# Patient Record
Sex: FEMALE | Race: BLACK OR AFRICAN AMERICAN | NOT HISPANIC OR LATINO | URBAN - METROPOLITAN AREA
[De-identification: names, ages, dates, MRNs, and addresses within clinical notes are randomized per-mention and may not be internally consistent; named-entity substitution may affect disease eponyms.]

---

## 2020-01-01 ENCOUNTER — INPATIENT (INPATIENT)
Facility: HOSPITAL | Age: 60
LOS: 5 days | DRG: 393 | End: 2020-07-07
Attending: HOSPITALIST | Admitting: INTERNAL MEDICINE
Payer: MEDICAID

## 2020-01-01 ENCOUNTER — EMERGENCY (EMERGENCY)
Facility: HOSPITAL | Age: 60
LOS: 1 days | Discharge: HOME | End: 2020-01-01
Attending: EMERGENCY MEDICINE
Payer: MEDICAID

## 2020-01-01 ENCOUNTER — EMERGENCY (EMERGENCY)
Facility: HOSPITAL | Age: 60
LOS: 1 days | Discharge: HOME | End: 2020-01-01
Attending: EMERGENCY MEDICINE
Payer: COMMERCIAL

## 2020-01-01 VITALS
SYSTOLIC BLOOD PRESSURE: 155 MMHG | TEMPERATURE: 99 F | RESPIRATION RATE: 16 BRPM | HEART RATE: 101 BPM | OXYGEN SATURATION: 96 % | DIASTOLIC BLOOD PRESSURE: 97 MMHG

## 2020-01-01 VITALS
SYSTOLIC BLOOD PRESSURE: 124 MMHG | DIASTOLIC BLOOD PRESSURE: 79 MMHG | HEART RATE: 104 BPM | OXYGEN SATURATION: 92 % | RESPIRATION RATE: 17 BRPM

## 2020-01-01 VITALS
TEMPERATURE: 98 F | OXYGEN SATURATION: 98 % | DIASTOLIC BLOOD PRESSURE: 82 MMHG | SYSTOLIC BLOOD PRESSURE: 128 MMHG | RESPIRATION RATE: 19 BRPM | HEART RATE: 98 BPM

## 2020-01-01 VITALS
DIASTOLIC BLOOD PRESSURE: 74 MMHG | TEMPERATURE: 98 F | HEART RATE: 113 BPM | SYSTOLIC BLOOD PRESSURE: 142 MMHG | RESPIRATION RATE: 20 BRPM | OXYGEN SATURATION: 100 %

## 2020-01-01 VITALS
RESPIRATION RATE: 18 BRPM | HEART RATE: 88 BPM | DIASTOLIC BLOOD PRESSURE: 76 MMHG | OXYGEN SATURATION: 96 % | TEMPERATURE: 99 F | SYSTOLIC BLOOD PRESSURE: 146 MMHG

## 2020-01-01 VITALS
SYSTOLIC BLOOD PRESSURE: 155 MMHG | RESPIRATION RATE: 18 BRPM | OXYGEN SATURATION: 99 % | TEMPERATURE: 98 F | DIASTOLIC BLOOD PRESSURE: 95 MMHG | HEART RATE: 110 BPM

## 2020-01-01 VITALS
SYSTOLIC BLOOD PRESSURE: 146 MMHG | OXYGEN SATURATION: 99 % | HEART RATE: 102 BPM | RESPIRATION RATE: 16 BRPM | TEMPERATURE: 99 F | DIASTOLIC BLOOD PRESSURE: 78 MMHG

## 2020-01-01 VITALS
OXYGEN SATURATION: 95 % | DIASTOLIC BLOOD PRESSURE: 88 MMHG | SYSTOLIC BLOOD PRESSURE: 148 MMHG | TEMPERATURE: 98 F | RESPIRATION RATE: 18 BRPM | HEART RATE: 98 BPM

## 2020-01-01 VITALS
OXYGEN SATURATION: 97 % | TEMPERATURE: 99 F | HEART RATE: 99 BPM | SYSTOLIC BLOOD PRESSURE: 134 MMHG | RESPIRATION RATE: 18 BRPM | DIASTOLIC BLOOD PRESSURE: 87 MMHG

## 2020-01-01 VITALS
DIASTOLIC BLOOD PRESSURE: 99 MMHG | HEART RATE: 109 BPM | SYSTOLIC BLOOD PRESSURE: 178 MMHG | RESPIRATION RATE: 16 BRPM | HEIGHT: 66 IN | TEMPERATURE: 98 F | WEIGHT: 184.97 LBS | OXYGEN SATURATION: 96 %

## 2020-01-01 DIAGNOSIS — I46.9 CARDIAC ARREST, CAUSE UNSPECIFIED: ICD-10-CM

## 2020-01-01 DIAGNOSIS — Z51.5 ENCOUNTER FOR PALLIATIVE CARE: ICD-10-CM

## 2020-01-01 DIAGNOSIS — R13.10 DYSPHAGIA, UNSPECIFIED: ICD-10-CM

## 2020-01-01 DIAGNOSIS — G89.3 NEOPLASM RELATED PAIN (ACUTE) (CHRONIC): ICD-10-CM

## 2020-01-01 DIAGNOSIS — Z98.890 OTHER SPECIFIED POSTPROCEDURAL STATES: Chronic | ICD-10-CM

## 2020-01-01 DIAGNOSIS — Z43.1 ENCOUNTER FOR ATTENTION TO GASTROSTOMY: ICD-10-CM

## 2020-01-01 DIAGNOSIS — C71.9 MALIGNANT NEOPLASM OF BRAIN, UNSPECIFIED: ICD-10-CM

## 2020-01-01 DIAGNOSIS — T85.598A OTHER MECHANICAL COMPLICATION OF OTHER GASTROINTESTINAL PROSTHETIC DEVICES, IMPLANTS AND GRAFTS, INITIAL ENCOUNTER: ICD-10-CM

## 2020-01-01 LAB
A1C WITH ESTIMATED AVERAGE GLUCOSE RESULT: 5.6 % — SIGNIFICANT CHANGE UP (ref 4–5.6)
ALBUMIN SERPL ELPH-MCNC: 2.6 G/DL — LOW (ref 3.3–5.2)
ALBUMIN SERPL ELPH-MCNC: 2.8 G/DL — LOW (ref 3.3–5.2)
ALBUMIN SERPL ELPH-MCNC: 2.8 G/DL — LOW (ref 3.3–5.2)
ALBUMIN SERPL ELPH-MCNC: 3.1 G/DL — LOW (ref 3.3–5.2)
ALP SERPL-CCNC: 113 U/L — SIGNIFICANT CHANGE UP (ref 40–120)
ALP SERPL-CCNC: 126 U/L — HIGH (ref 40–120)
ALP SERPL-CCNC: 126 U/L — HIGH (ref 40–120)
ALP SERPL-CCNC: 127 U/L — HIGH (ref 40–120)
ALT FLD-CCNC: 11 U/L — SIGNIFICANT CHANGE UP
ALT FLD-CCNC: 12 U/L — SIGNIFICANT CHANGE UP
ALT FLD-CCNC: 13 U/L — SIGNIFICANT CHANGE UP
ALT FLD-CCNC: 15 U/L — SIGNIFICANT CHANGE UP
ANION GAP SERPL CALC-SCNC: 10 MMOL/L — SIGNIFICANT CHANGE UP (ref 5–17)
ANION GAP SERPL CALC-SCNC: 11 MMOL/L — SIGNIFICANT CHANGE UP (ref 5–17)
ANION GAP SERPL CALC-SCNC: 13 MMOL/L — SIGNIFICANT CHANGE UP (ref 5–17)
ANION GAP SERPL CALC-SCNC: 13 MMOL/L — SIGNIFICANT CHANGE UP (ref 5–17)
ANION GAP SERPL CALC-SCNC: 14 MMOL/L — SIGNIFICANT CHANGE UP (ref 5–17)
ANION GAP SERPL CALC-SCNC: 15 MMOL/L — SIGNIFICANT CHANGE UP (ref 5–17)
ANISOCYTOSIS BLD QL: SLIGHT — SIGNIFICANT CHANGE UP
APTT BLD: 23.4 SEC — LOW (ref 27.5–36.3)
APTT BLD: 24.1 SEC — LOW (ref 27.5–36.3)
APTT BLD: 34.2 SEC — SIGNIFICANT CHANGE UP (ref 27.5–35.5)
AST SERPL-CCNC: 16 U/L — SIGNIFICANT CHANGE UP
AST SERPL-CCNC: 19 U/L — SIGNIFICANT CHANGE UP
AST SERPL-CCNC: 19 U/L — SIGNIFICANT CHANGE UP
AST SERPL-CCNC: 26 U/L — SIGNIFICANT CHANGE UP
BASOPHILS # BLD AUTO: 0 K/UL — SIGNIFICANT CHANGE UP (ref 0–0.2)
BASOPHILS # BLD AUTO: 0.01 K/UL — SIGNIFICANT CHANGE UP (ref 0–0.2)
BASOPHILS NFR BLD AUTO: 0 % — SIGNIFICANT CHANGE UP (ref 0–2)
BASOPHILS NFR BLD AUTO: 0.1 % — SIGNIFICANT CHANGE UP (ref 0–2)
BILIRUB SERPL-MCNC: 0.3 MG/DL — LOW (ref 0.4–2)
BILIRUB SERPL-MCNC: 0.4 MG/DL — SIGNIFICANT CHANGE UP (ref 0.4–2)
BLD GP AB SCN SERPL QL: SIGNIFICANT CHANGE UP
BLD GP AB SCN SERPL QL: SIGNIFICANT CHANGE UP
BUN SERPL-MCNC: 11 MG/DL — SIGNIFICANT CHANGE UP (ref 8–20)
BUN SERPL-MCNC: 14 MG/DL — SIGNIFICANT CHANGE UP (ref 8–20)
BUN SERPL-MCNC: 5 MG/DL — LOW (ref 8–20)
BUN SERPL-MCNC: 8 MG/DL — SIGNIFICANT CHANGE UP (ref 8–20)
CALCIUM SERPL-MCNC: 9.1 MG/DL — SIGNIFICANT CHANGE UP (ref 8.6–10.2)
CALCIUM SERPL-MCNC: 9.2 MG/DL — SIGNIFICANT CHANGE UP (ref 8.6–10.2)
CALCIUM SERPL-MCNC: 9.5 MG/DL — SIGNIFICANT CHANGE UP (ref 8.6–10.2)
CALCIUM SERPL-MCNC: 9.8 MG/DL — SIGNIFICANT CHANGE UP (ref 8.6–10.2)
CHLORIDE SERPL-SCNC: 102 MMOL/L — SIGNIFICANT CHANGE UP (ref 98–107)
CHLORIDE SERPL-SCNC: 103 MMOL/L — SIGNIFICANT CHANGE UP (ref 98–107)
CO2 SERPL-SCNC: 24 MMOL/L — SIGNIFICANT CHANGE UP (ref 22–29)
CO2 SERPL-SCNC: 25 MMOL/L — SIGNIFICANT CHANGE UP (ref 22–29)
CO2 SERPL-SCNC: 27 MMOL/L — SIGNIFICANT CHANGE UP (ref 22–29)
CO2 SERPL-SCNC: 29 MMOL/L — SIGNIFICANT CHANGE UP (ref 22–29)
CO2 SERPL-SCNC: 30 MMOL/L — HIGH (ref 22–29)
CO2 SERPL-SCNC: 31 MMOL/L — HIGH (ref 22–29)
CREAT SERPL-MCNC: 0.2 MG/DL — LOW (ref 0.5–1.3)
CREAT SERPL-MCNC: 0.26 MG/DL — LOW (ref 0.5–1.3)
CREAT SERPL-MCNC: 0.28 MG/DL — LOW (ref 0.5–1.3)
CREAT SERPL-MCNC: 0.37 MG/DL — LOW (ref 0.5–1.3)
CREAT SERPL-MCNC: 0.38 MG/DL — LOW (ref 0.5–1.3)
CREAT SERPL-MCNC: 0.45 MG/DL — LOW (ref 0.5–1.3)
D DIMER BLD IA.RAPID-MCNC: 4931 NG/ML DDU — HIGH
EOSINOPHIL # BLD AUTO: 0.07 K/UL — SIGNIFICANT CHANGE UP (ref 0–0.5)
EOSINOPHIL # BLD AUTO: 0.15 K/UL — SIGNIFICANT CHANGE UP (ref 0–0.5)
EOSINOPHIL NFR BLD AUTO: 0.9 % — SIGNIFICANT CHANGE UP (ref 0–6)
EOSINOPHIL NFR BLD AUTO: 1.5 % — SIGNIFICANT CHANGE UP (ref 0–6)
ESTIMATED AVERAGE GLUCOSE: 114 MG/DL — SIGNIFICANT CHANGE UP (ref 68–114)
GLUCOSE BLDC GLUCOMTR-MCNC: 100 MG/DL — HIGH (ref 70–99)
GLUCOSE BLDC GLUCOMTR-MCNC: 105 MG/DL — HIGH (ref 70–99)
GLUCOSE BLDC GLUCOMTR-MCNC: 106 MG/DL — HIGH (ref 70–99)
GLUCOSE BLDC GLUCOMTR-MCNC: 111 MG/DL — HIGH (ref 70–99)
GLUCOSE BLDC GLUCOMTR-MCNC: 115 MG/DL — HIGH (ref 70–99)
GLUCOSE BLDC GLUCOMTR-MCNC: 116 MG/DL — HIGH (ref 70–99)
GLUCOSE BLDC GLUCOMTR-MCNC: 121 MG/DL — HIGH (ref 70–99)
GLUCOSE BLDC GLUCOMTR-MCNC: 122 MG/DL — HIGH (ref 70–99)
GLUCOSE BLDC GLUCOMTR-MCNC: 132 MG/DL — HIGH (ref 70–99)
GLUCOSE BLDC GLUCOMTR-MCNC: 133 MG/DL — HIGH (ref 70–99)
GLUCOSE BLDC GLUCOMTR-MCNC: 148 MG/DL — HIGH (ref 70–99)
GLUCOSE BLDC GLUCOMTR-MCNC: 162 MG/DL — HIGH (ref 70–99)
GLUCOSE BLDC GLUCOMTR-MCNC: 189 MG/DL — HIGH (ref 70–99)
GLUCOSE BLDC GLUCOMTR-MCNC: 78 MG/DL — SIGNIFICANT CHANGE UP (ref 70–99)
GLUCOSE BLDC GLUCOMTR-MCNC: 87 MG/DL — SIGNIFICANT CHANGE UP (ref 70–99)
GLUCOSE BLDC GLUCOMTR-MCNC: 89 MG/DL — SIGNIFICANT CHANGE UP (ref 70–99)
GLUCOSE BLDC GLUCOMTR-MCNC: 94 MG/DL — SIGNIFICANT CHANGE UP (ref 70–99)
GLUCOSE BLDC GLUCOMTR-MCNC: 95 MG/DL — SIGNIFICANT CHANGE UP (ref 70–99)
GLUCOSE SERPL-MCNC: 108 MG/DL — HIGH (ref 70–99)
GLUCOSE SERPL-MCNC: 114 MG/DL — HIGH (ref 70–99)
GLUCOSE SERPL-MCNC: 117 MG/DL — HIGH (ref 70–99)
GLUCOSE SERPL-MCNC: 170 MG/DL — HIGH (ref 70–99)
GLUCOSE SERPL-MCNC: 92 MG/DL — SIGNIFICANT CHANGE UP (ref 70–99)
GLUCOSE SERPL-MCNC: 93 MG/DL — SIGNIFICANT CHANGE UP (ref 70–99)
HCT VFR BLD CALC: 36.2 % — SIGNIFICANT CHANGE UP (ref 34.5–45)
HCT VFR BLD CALC: 38.5 % — SIGNIFICANT CHANGE UP (ref 34.5–45)
HCT VFR BLD CALC: 38.7 % — SIGNIFICANT CHANGE UP (ref 34.5–45)
HCT VFR BLD CALC: 39 % — SIGNIFICANT CHANGE UP (ref 34.5–45)
HCT VFR BLD CALC: 41.7 % — SIGNIFICANT CHANGE UP (ref 34.5–45)
HCV AB S/CO SERPL IA: 0.07 S/CO — SIGNIFICANT CHANGE UP (ref 0–0.99)
HCV AB SERPL-IMP: SIGNIFICANT CHANGE UP
HGB BLD-MCNC: 11.6 G/DL — SIGNIFICANT CHANGE UP (ref 11.5–15.5)
HGB BLD-MCNC: 12.5 G/DL — SIGNIFICANT CHANGE UP (ref 11.5–15.5)
HGB BLD-MCNC: 12.6 G/DL — SIGNIFICANT CHANGE UP (ref 11.5–15.5)
HGB BLD-MCNC: 12.6 G/DL — SIGNIFICANT CHANGE UP (ref 11.5–15.5)
HGB BLD-MCNC: 13.1 G/DL — SIGNIFICANT CHANGE UP (ref 11.5–15.5)
HYPOCHROMIA BLD QL: SLIGHT — SIGNIFICANT CHANGE UP
IMM GRANULOCYTES NFR BLD AUTO: 0.4 % — SIGNIFICANT CHANGE UP (ref 0–1.5)
INR BLD: 1.14 RATIO — SIGNIFICANT CHANGE UP (ref 0.88–1.16)
INR BLD: 1.2 RATIO — HIGH (ref 0.88–1.16)
INR BLD: 1.2 RATIO — HIGH (ref 0.88–1.16)
INR BLD: 1.33 RATIO — HIGH (ref 0.88–1.16)
LIDOCAIN IGE QN: 20 U/L — LOW (ref 22–51)
LYMPHOCYTES # BLD AUTO: 0.73 K/UL — LOW (ref 1–3.3)
LYMPHOCYTES # BLD AUTO: 1.26 K/UL — SIGNIFICANT CHANGE UP (ref 1–3.3)
LYMPHOCYTES # BLD AUTO: 12.8 % — LOW (ref 13–44)
LYMPHOCYTES # BLD AUTO: 9.7 % — LOW (ref 13–44)
MACROCYTES BLD QL: SLIGHT — SIGNIFICANT CHANGE UP
MANUAL SMEAR VERIFICATION: SIGNIFICANT CHANGE UP
MCHC RBC-ENTMCNC: 29.7 PG — SIGNIFICANT CHANGE UP (ref 27–34)
MCHC RBC-ENTMCNC: 29.8 PG — SIGNIFICANT CHANGE UP (ref 27–34)
MCHC RBC-ENTMCNC: 30.3 PG — SIGNIFICANT CHANGE UP (ref 27–34)
MCHC RBC-ENTMCNC: 30.4 PG — SIGNIFICANT CHANGE UP (ref 27–34)
MCHC RBC-ENTMCNC: 30.7 PG — SIGNIFICANT CHANGE UP (ref 27–34)
MCHC RBC-ENTMCNC: 31.4 GM/DL — LOW (ref 32–36)
MCHC RBC-ENTMCNC: 32 GM/DL — SIGNIFICANT CHANGE UP (ref 32–36)
MCHC RBC-ENTMCNC: 32.3 GM/DL — SIGNIFICANT CHANGE UP (ref 32–36)
MCHC RBC-ENTMCNC: 32.3 GM/DL — SIGNIFICANT CHANGE UP (ref 32–36)
MCHC RBC-ENTMCNC: 32.7 GM/DL — SIGNIFICANT CHANGE UP (ref 32–36)
MCV RBC AUTO: 92.4 FL — SIGNIFICANT CHANGE UP (ref 80–100)
MCV RBC AUTO: 92.8 FL — SIGNIFICANT CHANGE UP (ref 80–100)
MCV RBC AUTO: 93.8 FL — SIGNIFICANT CHANGE UP (ref 80–100)
MCV RBC AUTO: 94.6 FL — SIGNIFICANT CHANGE UP (ref 80–100)
MCV RBC AUTO: 95.8 FL — SIGNIFICANT CHANGE UP (ref 80–100)
METAMYELOCYTES # FLD: 0.9 % — HIGH (ref 0–0)
MONOCYTES # BLD AUTO: 0.46 K/UL — SIGNIFICANT CHANGE UP (ref 0–0.9)
MONOCYTES # BLD AUTO: 0.82 K/UL — SIGNIFICANT CHANGE UP (ref 0–0.9)
MONOCYTES NFR BLD AUTO: 6.1 % — SIGNIFICANT CHANGE UP (ref 2–14)
MONOCYTES NFR BLD AUTO: 8.3 % — SIGNIFICANT CHANGE UP (ref 2–14)
MYELOCYTES NFR BLD: 2.6 % — HIGH (ref 0–0)
NEUTROPHILS # BLD AUTO: 5.93 K/UL — SIGNIFICANT CHANGE UP (ref 1.8–7.4)
NEUTROPHILS # BLD AUTO: 7.56 K/UL — HIGH (ref 1.8–7.4)
NEUTROPHILS NFR BLD AUTO: 76.9 % — SIGNIFICANT CHANGE UP (ref 43–77)
NEUTROPHILS NFR BLD AUTO: 78.9 % — HIGH (ref 43–77)
NT-PROBNP SERPL-SCNC: 286 PG/ML — SIGNIFICANT CHANGE UP (ref 0–300)
OVALOCYTES BLD QL SMEAR: SLIGHT — SIGNIFICANT CHANGE UP
PLAT MORPH BLD: NORMAL — SIGNIFICANT CHANGE UP
PLATELET # BLD AUTO: 176 K/UL — SIGNIFICANT CHANGE UP (ref 150–400)
PLATELET # BLD AUTO: 189 K/UL — SIGNIFICANT CHANGE UP (ref 150–400)
PLATELET # BLD AUTO: 209 K/UL — SIGNIFICANT CHANGE UP (ref 150–400)
PLATELET # BLD AUTO: 209 K/UL — SIGNIFICANT CHANGE UP (ref 150–400)
PLATELET # BLD AUTO: 331 K/UL — SIGNIFICANT CHANGE UP (ref 150–400)
POIKILOCYTOSIS BLD QL AUTO: SLIGHT — SIGNIFICANT CHANGE UP
POLYCHROMASIA BLD QL SMEAR: SLIGHT — SIGNIFICANT CHANGE UP
POTASSIUM SERPL-MCNC: 3.2 MMOL/L — LOW (ref 3.5–5.3)
POTASSIUM SERPL-MCNC: 3.6 MMOL/L — SIGNIFICANT CHANGE UP (ref 3.5–5.3)
POTASSIUM SERPL-MCNC: 3.7 MMOL/L — SIGNIFICANT CHANGE UP (ref 3.5–5.3)
POTASSIUM SERPL-MCNC: 3.8 MMOL/L — SIGNIFICANT CHANGE UP (ref 3.5–5.3)
POTASSIUM SERPL-SCNC: 3.2 MMOL/L — LOW (ref 3.5–5.3)
POTASSIUM SERPL-SCNC: 3.6 MMOL/L — SIGNIFICANT CHANGE UP (ref 3.5–5.3)
POTASSIUM SERPL-SCNC: 3.7 MMOL/L — SIGNIFICANT CHANGE UP (ref 3.5–5.3)
POTASSIUM SERPL-SCNC: 3.8 MMOL/L — SIGNIFICANT CHANGE UP (ref 3.5–5.3)
PROT SERPL-MCNC: 5.5 G/DL — LOW (ref 6.6–8.7)
PROT SERPL-MCNC: 5.5 G/DL — LOW (ref 6.6–8.7)
PROT SERPL-MCNC: 5.7 G/DL — LOW (ref 6.6–8.7)
PROT SERPL-MCNC: 5.7 G/DL — LOW (ref 6.6–8.7)
PROTHROM AB SERPL-ACNC: 12.9 SEC — SIGNIFICANT CHANGE UP (ref 10–12.9)
PROTHROM AB SERPL-ACNC: 13.6 SEC — HIGH (ref 10–12.9)
PROTHROM AB SERPL-ACNC: 13.8 SEC — HIGH (ref 10.6–13.6)
PROTHROM AB SERPL-ACNC: 15.2 SEC — HIGH (ref 10.6–13.6)
RBC # BLD: 3.78 M/UL — LOW (ref 3.8–5.2)
RBC # BLD: 4.15 M/UL — SIGNIFICANT CHANGE UP (ref 3.8–5.2)
RBC # BLD: 4.16 M/UL — SIGNIFICANT CHANGE UP (ref 3.8–5.2)
RBC # BLD: 4.19 M/UL — SIGNIFICANT CHANGE UP (ref 3.8–5.2)
RBC # BLD: 4.41 M/UL — SIGNIFICANT CHANGE UP (ref 3.8–5.2)
RBC # FLD: 15.5 % — HIGH (ref 10.3–14.5)
RBC # FLD: 15.8 % — HIGH (ref 10.3–14.5)
RBC # FLD: 15.8 % — HIGH (ref 10.3–14.5)
RBC # FLD: 15.9 % — HIGH (ref 10.3–14.5)
RBC # FLD: 16 % — HIGH (ref 10.3–14.5)
RBC BLD AUTO: ABNORMAL
SARS-COV-2 IGG SERPL QL IA: NEGATIVE — SIGNIFICANT CHANGE UP
SARS-COV-2 IGM SERPL IA-ACNC: <3.8 AU/ML — SIGNIFICANT CHANGE UP
SARS-COV-2 RNA SPEC QL NAA+PROBE: SIGNIFICANT CHANGE UP
SCHISTOCYTES BLD QL AUTO: SLIGHT — SIGNIFICANT CHANGE UP
SMUDGE CELLS # BLD: PRESENT — SIGNIFICANT CHANGE UP
SODIUM SERPL-SCNC: 141 MMOL/L — SIGNIFICANT CHANGE UP (ref 135–145)
SODIUM SERPL-SCNC: 141 MMOL/L — SIGNIFICANT CHANGE UP (ref 135–145)
SODIUM SERPL-SCNC: 142 MMOL/L — SIGNIFICANT CHANGE UP (ref 135–145)
SODIUM SERPL-SCNC: 143 MMOL/L — SIGNIFICANT CHANGE UP (ref 135–145)
SODIUM SERPL-SCNC: 145 MMOL/L — SIGNIFICANT CHANGE UP (ref 135–145)
SODIUM SERPL-SCNC: 145 MMOL/L — SIGNIFICANT CHANGE UP (ref 135–145)
TROPONIN T SERPL-MCNC: <0.01 NG/ML — SIGNIFICANT CHANGE UP (ref 0–0.06)
VARIANT LYMPHS # BLD: 0.9 % — SIGNIFICANT CHANGE UP (ref 0–6)
WBC # BLD: 10.83 K/UL — HIGH (ref 3.8–10.5)
WBC # BLD: 7.35 K/UL — SIGNIFICANT CHANGE UP (ref 3.8–10.5)
WBC # BLD: 7.51 K/UL — SIGNIFICANT CHANGE UP (ref 3.8–10.5)
WBC # BLD: 8.71 K/UL — SIGNIFICANT CHANGE UP (ref 3.8–10.5)
WBC # BLD: 9.84 K/UL — SIGNIFICANT CHANGE UP (ref 3.8–10.5)
WBC # FLD AUTO: 10.83 K/UL — HIGH (ref 3.8–10.5)
WBC # FLD AUTO: 7.35 K/UL — SIGNIFICANT CHANGE UP (ref 3.8–10.5)
WBC # FLD AUTO: 7.51 K/UL — SIGNIFICANT CHANGE UP (ref 3.8–10.5)
WBC # FLD AUTO: 8.71 K/UL — SIGNIFICANT CHANGE UP (ref 3.8–10.5)
WBC # FLD AUTO: 9.84 K/UL — SIGNIFICANT CHANGE UP (ref 3.8–10.5)

## 2020-01-01 PROCEDURE — 71045 X-RAY EXAM CHEST 1 VIEW: CPT | Mod: 26

## 2020-01-01 PROCEDURE — 85027 COMPLETE CBC AUTOMATED: CPT

## 2020-01-01 PROCEDURE — 86901 BLOOD TYPING SEROLOGIC RH(D): CPT

## 2020-01-01 PROCEDURE — 85610 PROTHROMBIN TIME: CPT

## 2020-01-01 PROCEDURE — 83690 ASSAY OF LIPASE: CPT

## 2020-01-01 PROCEDURE — L8699: CPT

## 2020-01-01 PROCEDURE — 71275 CT ANGIOGRAPHY CHEST: CPT

## 2020-01-01 PROCEDURE — 85730 THROMBOPLASTIN TIME PARTIAL: CPT

## 2020-01-01 PROCEDURE — 96375 TX/PRO/DX INJ NEW DRUG ADDON: CPT | Mod: XU

## 2020-01-01 PROCEDURE — U0003: CPT

## 2020-01-01 PROCEDURE — 86850 RBC ANTIBODY SCREEN: CPT

## 2020-01-01 PROCEDURE — 74177 CT ABD & PELVIS W/CONTRAST: CPT

## 2020-01-01 PROCEDURE — 96361 HYDRATE IV INFUSION ADD-ON: CPT

## 2020-01-01 PROCEDURE — 87635 SARS-COV-2 COVID-19 AMP PRB: CPT

## 2020-01-01 PROCEDURE — 99284 EMERGENCY DEPT VISIT MOD MDM: CPT | Mod: 25

## 2020-01-01 PROCEDURE — 99239 HOSP IP/OBS DSCHRG MGMT >30: CPT

## 2020-01-01 PROCEDURE — 99232 SBSQ HOSP IP/OBS MODERATE 35: CPT

## 2020-01-01 PROCEDURE — 49450 REPLACE G/C TUBE PERC: CPT

## 2020-01-01 PROCEDURE — 43762 RPLC GTUBE NO REVJ TRC: CPT

## 2020-01-01 PROCEDURE — 84702 CHORIONIC GONADOTROPIN TEST: CPT

## 2020-01-01 PROCEDURE — 96376 TX/PRO/DX INJ SAME DRUG ADON: CPT | Mod: XU

## 2020-01-01 PROCEDURE — 86900 BLOOD TYPING SEROLOGIC ABO: CPT

## 2020-01-01 PROCEDURE — 96375 TX/PRO/DX INJ NEW DRUG ADDON: CPT

## 2020-01-01 PROCEDURE — 99285 EMERGENCY DEPT VISIT HI MDM: CPT | Mod: 25

## 2020-01-01 PROCEDURE — 99233 SBSQ HOSP IP/OBS HIGH 50: CPT

## 2020-01-01 PROCEDURE — 82962 GLUCOSE BLOOD TEST: CPT

## 2020-01-01 PROCEDURE — 95819 EEG AWAKE AND ASLEEP: CPT | Mod: 26

## 2020-01-01 PROCEDURE — 76000 FLUOROSCOPY <1 HR PHYS/QHP: CPT

## 2020-01-01 PROCEDURE — 99284 EMERGENCY DEPT VISIT MOD MDM: CPT

## 2020-01-01 PROCEDURE — 96365 THER/PROPH/DIAG IV INF INIT: CPT | Mod: XU

## 2020-01-01 PROCEDURE — 70450 CT HEAD/BRAIN W/O DYE: CPT | Mod: 26

## 2020-01-01 PROCEDURE — 99223 1ST HOSP IP/OBS HIGH 75: CPT

## 2020-01-01 PROCEDURE — 80048 BASIC METABOLIC PNL TOTAL CA: CPT

## 2020-01-01 PROCEDURE — 74176 CT ABD & PELVIS W/O CONTRAST: CPT | Mod: 26

## 2020-01-01 PROCEDURE — 74018 RADEX ABDOMEN 1 VIEW: CPT | Mod: 26

## 2020-01-01 PROCEDURE — 80053 COMPREHEN METABOLIC PANEL: CPT

## 2020-01-01 PROCEDURE — 70450 CT HEAD/BRAIN W/O DYE: CPT

## 2020-01-01 PROCEDURE — 74018 RADEX ABDOMEN 1 VIEW: CPT

## 2020-01-01 PROCEDURE — 71275 CT ANGIOGRAPHY CHEST: CPT | Mod: 26

## 2020-01-01 PROCEDURE — 85379 FIBRIN DEGRADATION QUANT: CPT

## 2020-01-01 PROCEDURE — 99285 EMERGENCY DEPT VISIT HI MDM: CPT

## 2020-01-01 PROCEDURE — 74177 CT ABD & PELVIS W/CONTRAST: CPT | Mod: 26

## 2020-01-01 PROCEDURE — 36415 COLL VENOUS BLD VENIPUNCTURE: CPT

## 2020-01-01 PROCEDURE — 86769 SARS-COV-2 COVID-19 ANTIBODY: CPT

## 2020-01-01 PROCEDURE — C1769: CPT

## 2020-01-01 PROCEDURE — 86803 HEPATITIS C AB TEST: CPT

## 2020-01-01 PROCEDURE — 95819 EEG AWAKE AND ASLEEP: CPT

## 2020-01-01 PROCEDURE — 84484 ASSAY OF TROPONIN QUANT: CPT

## 2020-01-01 PROCEDURE — 71045 X-RAY EXAM CHEST 1 VIEW: CPT

## 2020-01-01 PROCEDURE — 96374 THER/PROPH/DIAG INJ IV PUSH: CPT

## 2020-01-01 PROCEDURE — 74176 CT ABD & PELVIS W/O CONTRAST: CPT

## 2020-01-01 PROCEDURE — 83036 HEMOGLOBIN GLYCOSYLATED A1C: CPT

## 2020-01-01 PROCEDURE — 83880 ASSAY OF NATRIURETIC PEPTIDE: CPT

## 2020-01-01 PROCEDURE — 96374 THER/PROPH/DIAG INJ IV PUSH: CPT | Mod: XU

## 2020-01-01 RX ORDER — OXYCODONE HYDROCHLORIDE 5 MG/1
15 TABLET ORAL ONCE
Refills: 0 | Status: DISCONTINUED | OUTPATIENT
Start: 2020-01-01 | End: 2020-01-01

## 2020-01-01 RX ORDER — MORPHINE SULFATE 50 MG/1
4 CAPSULE, EXTENDED RELEASE ORAL ONCE
Refills: 0 | Status: DISCONTINUED | OUTPATIENT
Start: 2020-01-01 | End: 2020-01-01

## 2020-01-01 RX ORDER — MORPHINE SULFATE 50 MG/1
4 CAPSULE, EXTENDED RELEASE ORAL
Refills: 0 | Status: DISCONTINUED | OUTPATIENT
Start: 2020-01-01 | End: 2020-01-01

## 2020-01-01 RX ORDER — SODIUM CHLORIDE 9 MG/ML
1000 INJECTION, SOLUTION INTRAVENOUS ONCE
Refills: 0 | Status: COMPLETED | OUTPATIENT
Start: 2020-01-01 | End: 2020-01-01

## 2020-01-01 RX ORDER — POTASSIUM CHLORIDE 20 MEQ
10 PACKET (EA) ORAL ONCE
Refills: 0 | Status: COMPLETED | OUTPATIENT
Start: 2020-01-01 | End: 2020-01-01

## 2020-01-01 RX ORDER — LEVETIRACETAM 250 MG/1
1000 TABLET, FILM COATED ORAL
Refills: 0 | Status: DISCONTINUED | OUTPATIENT
Start: 2020-01-01 | End: 2020-01-01

## 2020-01-01 RX ORDER — POLYETHYLENE GLYCOL 3350 17 G/17G
17 POWDER, FOR SOLUTION ORAL DAILY
Refills: 0 | Status: DISCONTINUED | OUTPATIENT
Start: 2020-01-01 | End: 2020-01-01

## 2020-01-01 RX ORDER — ONDANSETRON 8 MG/1
4 TABLET, FILM COATED ORAL ONCE
Refills: 0 | Status: COMPLETED | OUTPATIENT
Start: 2020-01-01 | End: 2020-01-01

## 2020-01-01 RX ORDER — LEVETIRACETAM 250 MG/1
500 TABLET, FILM COATED ORAL EVERY 12 HOURS
Refills: 0 | Status: DISCONTINUED | OUTPATIENT
Start: 2020-01-01 | End: 2020-01-01

## 2020-01-01 RX ORDER — AMLODIPINE BESYLATE 2.5 MG/1
1 TABLET ORAL
Qty: 0 | Refills: 0 | DISCHARGE

## 2020-01-01 RX ORDER — LEVETIRACETAM 250 MG/1
5 TABLET, FILM COATED ORAL
Qty: 0 | Refills: 0 | DISCHARGE

## 2020-01-01 RX ORDER — SODIUM CHLORIDE 9 MG/ML
1000 INJECTION INTRAMUSCULAR; INTRAVENOUS; SUBCUTANEOUS ONCE
Refills: 0 | Status: COMPLETED | OUTPATIENT
Start: 2020-01-01 | End: 2020-01-01

## 2020-01-01 RX ORDER — SIMVASTATIN 20 MG/1
1 TABLET, FILM COATED ORAL
Qty: 0 | Refills: 0 | DISCHARGE

## 2020-01-01 RX ORDER — SODIUM CHLORIDE 9 MG/ML
1000 INJECTION, SOLUTION INTRAVENOUS
Refills: 0 | Status: DISCONTINUED | OUTPATIENT
Start: 2020-01-01 | End: 2020-01-01

## 2020-01-01 RX ORDER — ENOXAPARIN SODIUM 100 MG/ML
40 INJECTION SUBCUTANEOUS DAILY
Refills: 0 | Status: DISCONTINUED | OUTPATIENT
Start: 2020-01-01 | End: 2020-01-01

## 2020-01-01 RX ORDER — DEXAMETHASONE 0.5 MG/5ML
8 ELIXIR ORAL ONCE
Refills: 0 | Status: COMPLETED | OUTPATIENT
Start: 2020-01-01 | End: 2020-01-01

## 2020-01-01 RX ORDER — LEVETIRACETAM 250 MG/1
500 TABLET, FILM COATED ORAL ONCE
Refills: 0 | Status: COMPLETED | OUTPATIENT
Start: 2020-01-01 | End: 2020-01-01

## 2020-01-01 RX ORDER — HYDROMORPHONE HYDROCHLORIDE 2 MG/ML
1 INJECTION INTRAMUSCULAR; INTRAVENOUS; SUBCUTANEOUS ONCE
Refills: 0 | Status: DISCONTINUED | OUTPATIENT
Start: 2020-01-01 | End: 2020-01-01

## 2020-01-01 RX ORDER — LEVETIRACETAM 250 MG/1
500 TABLET, FILM COATED ORAL
Refills: 0 | Status: DISCONTINUED | OUTPATIENT
Start: 2020-01-01 | End: 2020-01-01

## 2020-01-01 RX ORDER — LEVETIRACETAM 250 MG/1
250 TABLET, FILM COATED ORAL EVERY 12 HOURS
Refills: 0 | Status: DISCONTINUED | OUTPATIENT
Start: 2020-01-01 | End: 2020-01-01

## 2020-01-01 RX ORDER — CEFAZOLIN SODIUM 1 G
1000 VIAL (EA) INJECTION ONCE
Refills: 0 | Status: DISCONTINUED | OUTPATIENT
Start: 2020-01-01 | End: 2020-01-01

## 2020-01-01 RX ORDER — SODIUM CHLORIDE 9 MG/ML
1000 INJECTION INTRAMUSCULAR; INTRAVENOUS; SUBCUTANEOUS
Refills: 0 | Status: COMPLETED | OUTPATIENT
Start: 2020-01-01 | End: 2020-01-01

## 2020-01-01 RX ORDER — SENNA PLUS 8.6 MG/1
2 TABLET ORAL AT BEDTIME
Refills: 0 | Status: DISCONTINUED | OUTPATIENT
Start: 2020-01-01 | End: 2020-01-01

## 2020-01-01 RX ORDER — SODIUM CHLORIDE 9 MG/ML
3 INJECTION INTRAMUSCULAR; INTRAVENOUS; SUBCUTANEOUS ONCE
Refills: 0 | Status: COMPLETED | OUTPATIENT
Start: 2020-01-01 | End: 2020-01-01

## 2020-01-01 RX ORDER — OXYCODONE HYDROCHLORIDE 5 MG/1
1 TABLET ORAL
Qty: 0 | Refills: 0 | DISCHARGE

## 2020-01-01 RX ORDER — METFORMIN HYDROCHLORIDE 850 MG/1
0 TABLET ORAL
Qty: 0 | Refills: 0 | DISCHARGE

## 2020-01-01 RX ADMIN — MORPHINE SULFATE 4 MILLIGRAM(S): 50 CAPSULE, EXTENDED RELEASE ORAL at 21:49

## 2020-01-01 RX ADMIN — SODIUM CHLORIDE 1000 MILLILITER(S): 9 INJECTION INTRAMUSCULAR; INTRAVENOUS; SUBCUTANEOUS at 16:23

## 2020-01-01 RX ADMIN — POLYETHYLENE GLYCOL 3350 17 GRAM(S): 17 POWDER, FOR SOLUTION ORAL at 11:19

## 2020-01-01 RX ADMIN — LEVETIRACETAM 400 MILLIGRAM(S): 250 TABLET, FILM COATED ORAL at 05:37

## 2020-01-01 RX ADMIN — MORPHINE SULFATE 4 MILLIGRAM(S): 50 CAPSULE, EXTENDED RELEASE ORAL at 01:31

## 2020-01-01 RX ADMIN — Medication 100 MILLIEQUIVALENT(S): at 17:43

## 2020-01-01 RX ADMIN — LEVETIRACETAM 400 MILLIGRAM(S): 250 TABLET, FILM COATED ORAL at 18:22

## 2020-01-01 RX ADMIN — LEVETIRACETAM 500 MILLIGRAM(S): 250 TABLET, FILM COATED ORAL at 16:00

## 2020-01-01 RX ADMIN — Medication 101.6 MILLIGRAM(S): at 16:17

## 2020-01-01 RX ADMIN — LEVETIRACETAM 500 MILLIGRAM(S): 250 TABLET, FILM COATED ORAL at 06:27

## 2020-01-01 RX ADMIN — LEVETIRACETAM 500 MILLIGRAM(S): 250 TABLET, FILM COATED ORAL at 17:09

## 2020-01-01 RX ADMIN — HYDROMORPHONE HYDROCHLORIDE 1 MILLIGRAM(S): 2 INJECTION INTRAMUSCULAR; INTRAVENOUS; SUBCUTANEOUS at 09:14

## 2020-01-01 RX ADMIN — MORPHINE SULFATE 4 MILLIGRAM(S): 50 CAPSULE, EXTENDED RELEASE ORAL at 12:29

## 2020-01-01 RX ADMIN — LEVETIRACETAM 400 MILLIGRAM(S): 250 TABLET, FILM COATED ORAL at 18:01

## 2020-01-01 RX ADMIN — LEVETIRACETAM 1000 MILLIGRAM(S): 250 TABLET, FILM COATED ORAL at 01:30

## 2020-01-01 RX ADMIN — LEVETIRACETAM 420 MILLIGRAM(S): 250 TABLET, FILM COATED ORAL at 04:35

## 2020-01-01 RX ADMIN — Medication 100 MILLIEQUIVALENT(S): at 18:08

## 2020-01-01 RX ADMIN — LEVETIRACETAM 500 MILLIGRAM(S): 250 TABLET, FILM COATED ORAL at 17:58

## 2020-01-01 RX ADMIN — ONDANSETRON 4 MILLIGRAM(S): 8 TABLET, FILM COATED ORAL at 16:23

## 2020-01-01 RX ADMIN — OXYCODONE HYDROCHLORIDE 15 MILLIGRAM(S): 5 TABLET ORAL at 16:46

## 2020-01-01 RX ADMIN — MORPHINE SULFATE 4 MILLIGRAM(S): 50 CAPSULE, EXTENDED RELEASE ORAL at 01:52

## 2020-01-01 RX ADMIN — LEVETIRACETAM 420 MILLIGRAM(S): 250 TABLET, FILM COATED ORAL at 09:14

## 2020-01-01 RX ADMIN — ONDANSETRON 4 MILLIGRAM(S): 8 TABLET, FILM COATED ORAL at 19:39

## 2020-01-01 RX ADMIN — LEVETIRACETAM 400 MILLIGRAM(S): 250 TABLET, FILM COATED ORAL at 05:38

## 2020-01-01 RX ADMIN — ONDANSETRON 4 MILLIGRAM(S): 8 TABLET, FILM COATED ORAL at 15:35

## 2020-01-01 RX ADMIN — POLYETHYLENE GLYCOL 3350 17 GRAM(S): 17 POWDER, FOR SOLUTION ORAL at 13:35

## 2020-01-01 RX ADMIN — LEVETIRACETAM 500 MILLIGRAM(S): 250 TABLET, FILM COATED ORAL at 09:30

## 2020-01-01 RX ADMIN — LEVETIRACETAM 500 MILLIGRAM(S): 250 TABLET, FILM COATED ORAL at 05:27

## 2020-01-01 RX ADMIN — SODIUM CHLORIDE 500 MILLILITER(S): 9 INJECTION INTRAMUSCULAR; INTRAVENOUS; SUBCUTANEOUS at 14:22

## 2020-01-01 RX ADMIN — HYDROMORPHONE HYDROCHLORIDE 1 MILLIGRAM(S): 2 INJECTION INTRAMUSCULAR; INTRAVENOUS; SUBCUTANEOUS at 15:35

## 2020-01-01 RX ADMIN — MORPHINE SULFATE 4 MILLIGRAM(S): 50 CAPSULE, EXTENDED RELEASE ORAL at 10:39

## 2020-01-01 RX ADMIN — SODIUM CHLORIDE 150 MILLILITER(S): 9 INJECTION, SOLUTION INTRAVENOUS at 06:04

## 2020-01-01 RX ADMIN — SODIUM CHLORIDE 1000 MILLILITER(S): 9 INJECTION INTRAMUSCULAR; INTRAVENOUS; SUBCUTANEOUS at 19:39

## 2020-01-01 RX ADMIN — SODIUM CHLORIDE 3 MILLILITER(S): 9 INJECTION INTRAMUSCULAR; INTRAVENOUS; SUBCUTANEOUS at 15:32

## 2020-01-01 RX ADMIN — ONDANSETRON 4 MILLIGRAM(S): 8 TABLET, FILM COATED ORAL at 12:42

## 2020-01-01 RX ADMIN — LEVETIRACETAM 420 MILLIGRAM(S): 250 TABLET, FILM COATED ORAL at 15:45

## 2020-01-01 RX ADMIN — Medication 1 MILLIGRAM(S): at 09:46

## 2020-01-01 RX ADMIN — SODIUM CHLORIDE 1000 MILLILITER(S): 9 INJECTION, SOLUTION INTRAVENOUS at 05:50

## 2020-01-01 RX ADMIN — MORPHINE SULFATE 4 MILLIGRAM(S): 50 CAPSULE, EXTENDED RELEASE ORAL at 08:27

## 2020-01-01 RX ADMIN — SENNA PLUS 2 TABLET(S): 8.6 TABLET ORAL at 21:15

## 2020-01-01 RX ADMIN — MORPHINE SULFATE 4 MILLIGRAM(S): 50 CAPSULE, EXTENDED RELEASE ORAL at 16:23

## 2020-01-01 RX ADMIN — MORPHINE SULFATE 4 MILLIGRAM(S): 50 CAPSULE, EXTENDED RELEASE ORAL at 03:03

## 2020-01-01 RX ADMIN — SODIUM CHLORIDE 2000 MILLILITER(S): 9 INJECTION, SOLUTION INTRAVENOUS at 01:31

## 2020-01-01 RX ADMIN — SENNA PLUS 2 TABLET(S): 8.6 TABLET ORAL at 21:12

## 2020-01-01 RX ADMIN — LEVETIRACETAM 500 MILLIGRAM(S): 250 TABLET, FILM COATED ORAL at 16:49

## 2020-01-01 RX ADMIN — SODIUM CHLORIDE 150 MILLILITER(S): 9 INJECTION, SOLUTION INTRAVENOUS at 21:45

## 2020-01-01 RX ADMIN — SENNA PLUS 2 TABLET(S): 8.6 TABLET ORAL at 21:30

## 2020-01-01 RX ADMIN — LEVETIRACETAM 500 MILLIGRAM(S): 250 TABLET, FILM COATED ORAL at 05:22

## 2020-01-01 RX ADMIN — POLYETHYLENE GLYCOL 3350 17 GRAM(S): 17 POWDER, FOR SOLUTION ORAL at 11:59

## 2020-01-01 RX ADMIN — LEVETIRACETAM 420 MILLIGRAM(S): 250 TABLET, FILM COATED ORAL at 16:23

## 2020-01-01 RX ADMIN — SODIUM CHLORIDE 1000 MILLILITER(S): 9 INJECTION INTRAMUSCULAR; INTRAVENOUS; SUBCUTANEOUS at 23:58

## 2020-05-31 NOTE — PROCEDURE NOTE - GENERAL PROCEDURE DETAILS
erosion of skin surrounding gastrostomy tube tract. replaced 16 fr gastrostomy tube. recommend wound care evaluation.

## 2020-05-31 NOTE — ED PROVIDER NOTE - PHYSICAL EXAMINATION
Gen: intermittently groans. non toxic, awake  HEENT: Mucous membranes moist, pink conjunctivae, EOMI  CV: RRR, nl s1/s2.  Resp: CTAB, normal rate and effort  GI: g tube site with ~7 cm diameter area of excoriation with ?2 possible tracts, not clear gtube site, abd soft, no rebound/guarding otherwise  : No CVAT  Neuro: awake, groans, answers with limited verbal, left paralysis  MSK: No spine or joint tenderness to palpation  Skin: No rashes. intact and perfused.

## 2020-05-31 NOTE — ED PROVIDER NOTE - OBJECTIVE STATEMENT
59 y/o female htn, hld, dm, fibromyalgia seizures on keppra, s/p 2 craniotomies for glioblastoma, on home hospice, full code, sent from home s/p pulling out gtube ~630 this morning. 20 Bengali, was placed ~mid april in [place] when admitted during second craniotomy. pt with limited verbal and paralysis on left. Chronic pain and anxiety per son.    son Elijah , hospice     limited ros by verbal

## 2020-05-31 NOTE — ED ADULT TRIAGE NOTE - CHIEF COMPLAINT QUOTE
dislodged G-tube. pt non-verbal, with left sided paralysis from prior CVA. pt alert, no distress at this time.

## 2020-05-31 NOTE — ED PROVIDER NOTE - PROGRESS NOTE DETAILS
Rosalie: IR aware and to replace tube this afternoon. pending rapid covid. son notified. Rosalie: IR replaced tube with 16 Slovak, confirmed by them. spoke to pt's hospice, they can provide daily wound care and will send nurse daily to change dressings to tend to excoriating area around gtube. spoke to son, aware and agrees with plan. stable for d/c. transportaiton being set up by hospice (). eta ~6-7pm.

## 2020-05-31 NOTE — ED ADULT NURSE NOTE - NSIMPLEMENTINTERV_GEN_ALL_ED
Implemented All Fall Risk Interventions:  [place] to call system. Call bell, personal items and telephone within reach. Instruct patient to call for assistance. Room bathroom lighting operational. Non-slip footwear when patient is off stretcher. Physically safe environment: no spills, clutter or unnecessary equipment. Stretcher in lowest position, wheels locked, appropriate side rails in place. Provide visual cue, wrist band, yellow gown, etc. Monitor gait and stability. Monitor for mental status changes and reorient to person, place, and time. Review medications for side effects contributing to fall risk. Reinforce activity limits and safety measures with patient and family.

## 2020-05-31 NOTE — ED ADULT NURSE NOTE - OBJECTIVE STATEMENT
pt a&ox3, minimally verbal from prior CVA. pt has serous sanguinous drainage from abdomen, bandage in place. respirations even unlabored. pt denies pain. home hospice RN called stated pt is on hospice, hx brain cancer. pt educated on plan of care, pt able to successfully teach back plan of care to RN, RN will continue to reeducate pt during hospital stay.

## 2020-05-31 NOTE — ED PROVIDER NOTE - CLINICAL SUMMARY MEDICAL DECISION MAKING FREE TEXT BOX
gtube dislodged with breakdown at site, no clear tract (and only 1.5 months old). unable to replace tube bedside, gi consulted and also unable, IR consulted, to replace, will give iv meds for time being.

## 2020-05-31 NOTE — ED PROVIDER NOTE - PATIENT PORTAL LINK FT
You can access the FollowMyHealth Patient Portal offered by [facility] by registering at the following website: [email or web address]. By joining [employer]’s FollowMyHealth portal, you will also be able to view your health information using other applications (apps) compatible with our system.

## 2020-05-31 NOTE — ED PROVIDER NOTE - NSFOLLOWUPINSTRUCTIONS_ED_ALL_ED_FT
Need gtube dressing changes daily, can use xeroform directly over wound, and then dry gauze protecting that. Return for any complications, worsening wound, or any other concerns.   Continue all previously prescribed medications as directed. You can use motrin 600mg every 6-8 hours for pain or fever, and/or Tylenol 650 mg every 4 hours for pain/fever. Follow up with your primary care physician in 48-72 hours- bring copies of your results.  Return to the emergency department for chest pain, shortness of breath, dizziness, or worsening, concerning, or persistent symptoms.

## 2020-06-15 NOTE — ED ADULT NURSE NOTE - CHIEF COMPLAINT
Patient:   ÓSCAR POND            MRN: [id number]            FIN: [id number]              Age:   65 years     Sex:  MALE     :  [birth year]   Associated Diagnoses:   None   Author:   HARDY ARIZMENDI     Subjective   not all urine output recorded, external cath not staying in place  off bicarb drip     Objective   Intake and Output   I & O between:  02-DEC-2019 12:51 TO 03-DEC-2019 12:51  Med Dosing Weight:  90.5  kg   2019  24 Hour Intake:   463.97  ( 5.13 mL/kg )  24 Hour Output:   300.00           24 Hour Urine/Stool Output:   0.0  24 Hour Balance:   163.97           24 Hour Urine Output:   300.00  ( 0.14 mL/kg/hr )                   Urine Count:  5.00    Stool Count:  3.00         VS/Measurements   Vitals between:   02-DEC-2019 12:51:57   TO   03-DEC-2019 12:51:57                   LAST RESULT MINIMUM MAXIMUM  Temperature 36.3 36.2 36.4  Heart Rate 81 67 89  Respiratory Rate 14 7 17  NISBP           89 89 89  NIDBP           71 71 71  A Line Systolic 92 90 96  A Line Diastolic 68 68 71  A Line Mean 79 79 82  CVP                     7 7 7  SpO2                    97 97 100   ,    Dosing Weight:   90.5 kg    2019 07:57  Most Recent Clinical Weight:   129.2  kg    2019 20:00    Previous Clinical Weight:   124.4  kg 2019 00:00  Changed:   +   %3.86      General:  No acute distress.    Eye:  Normal conjunctiva.    HENT:  Normocephalic.    Respiratory:  Respirations are non-labored, diminished breath sounds..   Cardiovascular:  VAD pump sounds, Anasarca.    Gastrointestinal:  Soft, mild tenderness to palpation.    Lymphatics:  + leg edema.    Integumentary:  Warm, Dry.    Neurologic:  Alert.      Results Review   Labs between:  02-DEC-2019 12:51 to 03-DEC-2019 12:51  CBC:                 WBC  HgB  Hct  Plt  MCV  RDW   03-DEC-2019 7.6  (L) 8.2  (L) 25.5  153  91.7  (H) 17.7   BMP:                 Na  Cl  BUN  Glu   03-DEC-2019 138  99  (H) 56  (L) 60                              K  CO2   Cr  Ca                              4.6  (H) 33  (H) 1.93  8.5   BMP:                 Na  Cl  BUN  Glu   02-DEC-2019                                     K  CO2  Cr  Ca                              4.3         CMP:                 AST  ALT  AlkPhos  Bili  Albumin   03-DEC-2019 (H) 193  (H) 90  (H) 928  (H) 4.8  (L) 2.3   Other Chem:             Mg  Phos  Triglycerides  GGTP  DirectBili                           2.4           POC GLU:                 Latest Result  Latest Date  Minimum  Min Date  Maximum  Max Date                             92 03-DEC-2019 (L) 69  03-DEC-2019 99 02-DEC-2019  COAG:                 INR  PT  PTT  Ddimer  Fibrinogen    03-DEC-2019 1.3  (H) 13.3  (H) 50       02-DEC-2019     (H) 54                            Impression and Plan   1. ALVARO+CKD 3: recent baseline creatinine 1.8-2, recent nephrectomy for RCC. ALVARO in setting of recurrent hemolysis.  - cr has improved, back to baseline   - off bicarb drip  - ok for lasix  - ok for picc  2. CM: s/p LVAD now with recurrent hemolysis. not a candidate for pump exchange.       -  LDH slowly improving  - now on po lasix  - cardiology following  3. GIB: AVMs on EGD earlier this month.           -  transfuse as needed  discussed with RN   The patient is a 60y Female complaining of

## 2020-06-15 NOTE — ED PROVIDER NOTE - CARE PLAN
Principal Discharge DX:	PEG (percutaneous endoscopic gastrostomy) adjustment/replacement/removal Principal Discharge DX:	PEG (percutaneous endoscopic gastrostomy) adjustment/replacement/removal  Secondary Diagnosis:	Pulmonary embolism

## 2020-06-15 NOTE — ED ADULT NURSE NOTE - OBJECTIVE STATEMENT
60 year old female a&ox3 confused to month comes from home for pain at the g-tube site x2 weeks. pt. has mumbled speech. as per EMS pt. has hx. of brain ca and ha snot gotten pain meds in 8 hours and has hx. of CVA. 60 year old female a&ox3 confused to month comes from home for pain at the g-tube site x2 weeks. pt. has mumbled speech. as per EMS pt. has hx. of brain ca and ha snot gotten pain meds in 8 hours and has hx. of CVA. rawness noted around site.

## 2020-06-15 NOTE — ED PROVIDER NOTE - OBJECTIVE STATEMENT
61 y/o female htn, hld, dm, fibromyalgia seizures on keppra, s/p 2 craniotomies for glioblastoma, on home hospice, full code, sent from home for tube malfunction.  c/o abd pain--lower abd pain. denies n/v. last bm today and last prior to today was 1 week ago. denies f/c/s. denies cp/sob/palp. denies cough.  PMH: HTN, HLD, DM, Fibromyalgia, seizure d/o, GBM  SOCIAL: denies smoking/alcohol

## 2020-06-15 NOTE — ED PROVIDER NOTE - PROGRESS NOTE DETAILS
received sign out from dr michael. incidental possibel pe on ct scan ordered had long talk with son сергей. would like the PE worked up. she is on home hpsice but full code g tube in place on ct. spoekw to him about rescan pe chest agrees to scan. she is full code. worote for some of hwer home meds. I called hsopice awaiting their call back. at this point we need to scan chest to evaluate her pe clot burden to determine if need anticoag in light of bleeding risk with her GBM. per dr caceres need for this emergent test without pt consent to rule out life threatening dx, approved by me and verbally on phone byt pts POA son [email or web address].c ontinuing to monitor peg functioning, long talk with son elijah, explained despite incidental PE finding pt has absolute contraindication to antociagulation with intrnail lesions extrancranial bleeding sequale and too high risk to precipate intracranial bleeding pt and pts son Elijah POMALAIKA understand why we cannot start anticogaution and is agreeable to accepting mother back at his house with their continued home hospice care

## 2020-06-15 NOTE — ED PROVIDER NOTE - CLINICAL SUMMARY MEDICAL DECISION MAKING FREE TEXT BOX
patient arrived from home hospice for peg malfunction and possible obstruction. will do labs, ct, re-eval.  Patient signed out to incoming physician.  All decisions regarding the progression of care will be made at their discretion.

## 2020-06-15 NOTE — ED PROVIDER NOTE - PATIENT PORTAL LINK FT
You can access the FollowMyHealth Patient Portal offered by [facility] by registering at the following website: [email or web address]. By joining [employer]’s FollowMyHealth portal, you will also be able to view your health information using other applications (apps) compatible with our system.

## 2020-06-15 NOTE — ED PROVIDER NOTE - PHYSICAL EXAMINATION
General:     NAD  Head:     NC/AT, EOMI,   Neck:     trachea midline  Lungs:     CTA b/l, no w/r/r  CVS:     S1S2, RRR, no m/g/r  Abd:     +BS, s/nt. mildly distended, G-tube in place and withdrawing bilious fluid,   Ext:    2+ radial and pedal pulses, no c/c/e  Neuro: right sided weakness (baseline), awake, alert, oriented x1.

## 2020-06-16 NOTE — ED ADULT NURSE REASSESSMENT NOTE - NS ED NURSE REASSESS COMMENT FT1
Pt is aao x 4 and has been complaining for pain. Medication was administered as per Dr's order but seemed to get no relief.  Pt is on home hospice for cancer. Pt is able breathes spontaneous on room air, ID band is in place.  Safety maintained. Will continue to monitor.

## 2020-06-19 NOTE — ED ADULT NURSE NOTE - INTERVENTIONS DEFINITIONS
Physically safe environment: no spills, clutter or unnecessary equipment/Monitor gait and stability/Stretcher in lowest position, wheels locked, appropriate side rails in place/Reinforce activity limits and safety measures with patient and family/Monitor for mental status changes and reorient to person, place, and time

## 2020-06-19 NOTE — ED PROVIDER NOTE - OBJECTIVE STATEMENT
61 yo female with blocked feeding tube. Here 4 days ago for same. Here two weeks before that for same. Pt has glioblastoma originally dx at [facility] in [place]. Moved to [place] so her insurance will cover treatment.   I spoke at length with her son, Elijah. He told me her g tube in [place] was larger than the one she currently has and it did not usually clog, Concerned bc she hasn't had her meds and will be in pain. Pt had two tumors removed in [place]. She is cared for by her family.  Med hx Glioblastoma with surgery,   soc hx non ambulatroy lives with family

## 2020-06-19 NOTE — ED PROVIDER NOTE - PATIENT PORTAL LINK FT
You can access the FollowMyHealth Patient Portal offered by [facility] by registering at the following website: [email or web address]. By joining [employer]’s FollowMyHealth portal, you will also be able to view your health information using other applications (apps) compatible with our system.

## 2020-06-19 NOTE — ED ADULT NURSE NOTE - OBJECTIVE STATEMENT
as per triage report patient sent from nursing home for G-tube complication. abdominal binder in place, abdomen appears distended. reading through prior charts patient found to have been recently d/c three days ago for similar complaints. found to have no SBO or abdominal complications. patient Hx of craniectomy from glioblastoma. at this time patient moaning asking for help but unable to articulate what she needs or what is bothering her. able to state name only. patient sent without paperwork or aide. will attempt to contact NH for further information

## 2020-06-19 NOTE — ED PROVIDER NOTE - CARE PROVIDER_API CALL
Richard Brewster A  NEUROSURGERY  [address] [place] [address], [place] [address]  Phone: [phone]  Fax: [phone]  Follow Up Time:

## 2020-06-19 NOTE — ED ADULT NURSE REASSESSMENT NOTE - NS ED NURSE REASSESS COMMENT FT1
G-tube replaced by MD Noel w/o complications. confirmed via auscultation and x-ray confirmation. patient to return to NH

## 2020-06-19 NOTE — ED PROVIDER NOTE - NSFOLLOWUPINSTRUCTIONS_ED_ALL_ED_FT
Please follow up with the [facility] in [place]. [phone]  Consider seeing Dr Brewster, who specializes in Glioblastoma treatment  His practice's phone number is   The old g tube was a 16 Moroccan. The new tube is an 18 Moroccan - one size larger.  After her feeds, the tube must be flushed so it will not get blocked.

## 2020-06-19 NOTE — ED ADULT TRIAGE NOTE - CHIEF COMPLAINT QUOTE
EMS reports pt here for a "larger G tube", pt noted to have tenderness to abdomen and appears to be in some distress but due to disabilities unable to tell RN where the pain is.

## 2020-06-19 NOTE — ED PROVIDER NOTE - PROGRESS NOTE DETAILS
attempted to reach son after procedure but unable to reach him by phone  all info written on dc papers

## 2020-06-19 NOTE — ED PROVIDER NOTE - GASTROINTESTINAL NEGATIVE STATEMENT, MLM
no abdominal pain, no bloating, no constipation, no diarrhea, no nausea and no vomiting. g tube malfunction

## 2020-06-20 NOTE — ED PROVIDER NOTE - PROGRESS NOTE DETAILS
AJM: On attempt to test G tube, it was ntoed g tube was dislodged as balloon was deflated. g tube replaced, confirmed with xray. 20 cc balloon inflated. pt kept for observation to ensure balloon did not deflate. balloon seems intact. will dc. flushing well with return of gastric contents on repeat exam.

## 2020-06-20 NOTE — ED PROVIDER NOTE - PATIENT PORTAL LINK FT
You can access the FollowMyHealth Patient Portal offered by [facility] by registering at the following website: [email or web address]. By joining [employer]’s FollowMyHealth portal, you will also be able to view your health information using other applications (apps) compatible with our system.

## 2020-06-20 NOTE — ED ADULT TRIAGE NOTE - CADM TRG TX PRIOR TO ARRIVAL
Pt accepted for transfer to [facility] by Dr Hernandez. [facility] RN will call ER RN for report. Pt signed [facility] consents which writer faxed. Kaitlyn HOLT/RAS Intake   see ambulance record

## 2020-06-20 NOTE — ED PROVIDER NOTE - OBJECTIVE STATEMENT
60 year old male with history of glioblastoma with cognitive and speech deficit presenting with complaint of gtube displacement. pt unable to participate in history. Pt seen here yesterday for same. g tube confirmed with xray at that time. No trauma. no bleeding. no abd pain.

## 2020-06-20 NOTE — ED ADULT TRIAGE NOTE - CHIEF COMPLAINT QUOTE
Patient alert, speaks slowly, has Hx of brain Ca & brain Sx. Comes from home for PEG tube dislodgement.

## 2020-06-20 NOTE — ED ADULT NURSE NOTE - INTERVENTIONS DEFINITIONS
Room bathroom lighting operational/Physically safe environment: no spills, clutter or unnecessary equipment/Non-slip footwear when patient is off stretcher/[place] to call system/Call bell, personal items and telephone within reach/Instruct patient to call for assistance

## 2020-06-20 NOTE — ED PROVIDER NOTE - CLINICAL SUMMARY MEDICAL DECISION MAKING FREE TEXT BOX
pt with complaint of gtube dislodgement. one exam, gtube appears to be in place. will obtain xray to confirm placement. if in place will dc

## 2020-06-20 NOTE — ED ADULT NURSE NOTE - OBJECTIVE STATEMENT
Patient alert, speaks slowly, has Hx of brain Ca & brain Sx. Comes from home for PEG tube dislodgement.- poor historian

## 2020-07-01 PROBLEM — C71.9 MALIGNANT NEOPLASM OF BRAIN, UNSPECIFIED: Chronic | Status: ACTIVE | Noted: 2020-01-01

## 2020-07-01 NOTE — ED ADULT NURSE REASSESSMENT NOTE - NS ED NURSE REASSESS COMMENT FT1
Pt moved to holding Unit , verbal report given to Marcus Serrano , all questions answered, pt in o distress at this time , care endorsed to holding nurse

## 2020-07-01 NOTE — ED PROVIDER NOTE - OBJECTIVE STATEMENT
60 y/oF with h/o glioblastoma s/p 2 craniotomies, chronic pain  on oxycodone, lorazepam, diabetes on metformin 500 mg bid, htn on amlodipine 10mg od , seizure on keppra( 100mg/ ml) 5ml twice a day, hld on simvastatin s/p feeding tube  and bed bound for 3 months and lives at home with son who helps with her  at home. Pt has last partial meal around 11 am when feeds started coming out of tube. Last meal well was 8pm yesterday. Tube feeds at [facility]  . Last bowel movement yesterday

## 2020-07-01 NOTE — ED ADULT NURSE REASSESSMENT NOTE - NS ED NURSE REASSESS COMMENT FT1
lATE ENTRY : Unable to insert IV , Dr. Salazar made aware, MD at the [facility] EJ inserted by MD and blood obtained sent it to the lab

## 2020-07-01 NOTE — ED PROVIDER NOTE - MUSCULOSKELETAL, MLM
Spine appears normal, range of motion is not limited chronci spasticity of ext, no muscle or joint tenderness

## 2020-07-01 NOTE — ED ADULT NURSE NOTE - OBJECTIVE STATEMENT
pt BIBA from home s/p PEG tube dislodgment. Pt with history of brain CA- alert, but speaks slowly. NAD noted., poor  historian , unable to obtain any meaningful information

## 2020-07-01 NOTE — ED PROVIDER NOTE - CLINICAL SUMMARY MEDICAL DECISION MAKING FREE TEXT BOX
pt appears to have failed stoma of PEG, will check labs, admit, order meds as needed, spoke to son, informed about need for PEG replacement. Son request to place large gauge peg as it has been having constant issues

## 2020-07-01 NOTE — H&P ADULT - NSHPLABSRESULTS_GEN_ALL_CORE
12.6   9.84  )-----------( 209      ( 01 Jul 2020 16:20 )             38.5     07-01    145  |  102  |  8.0  ----------------------------<  108<H>  3.2<L>   |  30.0<H>  |  0.28<L>    Ca    9.2      01 Jul 2020 16:20    TPro  5.7<L>  /  Alb  3.1<L>  /  TBili  0.3<L>  /  DBili  x   /  AST  16  /  ALT  12  /  AlkPhos  126<H>  07-01

## 2020-07-01 NOTE — ED ADULT TRIAGE NOTE - CHIEF COMPLAINT QUOTE
pt BIBA from home s/p PEG tube dislodgment. Pt with history of brain CA- alert, but speaks slowly. NAD noted.

## 2020-07-01 NOTE — H&P ADULT - NSHPPHYSICALEXAM_GEN_ALL_CORE
PHYSICAL EXAM:    General: Well developed; well nourished; in no acute distress  Eyes: PERRLA, EOMI; conjunctiva and sclera clear  Head: Normocephalic; atraumatic  ENMT: No nasal discharge; airway clear  Neck: Supple; non tender; no masses  Respiratory: No wheezes, rales or rhonchi  Cardiovascular: Regular rate and rhythm. S1 and S2 Normal; No murmurs, gallops or rubs  Gastrointestinal: Soft non-tender non-distended; Normal bowel sounds  Genitourinary: No costovertebral angle tenderness  Extremities: Normal range of motion, No clubbing, cyanosis or edema  Vascular: Peripheral pulses palpable 2+ bilaterally  Neurological: Alert and oriented x4  Skin: Warm and dry. No acute rash  Lymph Nodes: No acute cervical adenopathy  Musculoskeletal: Normal gait, tone, without deformities  Psychiatric: Cooperative and appropriate PHYSICAL EXAM:  General: in no acute distress; uncomfortable from pain  Eyes: PERRLA, EOMI; conjunctiva and sclera clear  Neck: Supple; non tender; no masses  Respiratory: No wheezes, rales or rhonchi  Cardiovascular: Regular rate and rhythm. S1 and S2 Normal; No murmurs, gallops or rubs  Gastrointestinal: Soft non-tender non-distended; Normal bowel sounds  Extremities: Normal range of motion, No clubbing, cyanosis or edema  Vascular: Peripheral pulses palpable 2+ bilaterally  Neurological: Alert and unable to assess if oriented; paralysis of left side, left hemineglect, left facial droop

## 2020-07-01 NOTE — H&P ADULT - HISTORY OF PRESENT ILLNESS
60 y/oF with h/o glioblastoma s/p 2 craniotomies, chronic pain  on oxycodone, lorazepam, diabetes who presents from NH with PEG tube dislogdgement 61 y/o F with h/o glioblastoma s/p 2 craniotomies, chronic pain  on oxycodone, lorazepam, diabetes, hyperlipidemia who presents from home with PEG tube dislogdement. Patient in October of last year had stroke like symptoms on the left and was brought to the hospital in [place] and found to have gliobastoma - underwent craniotomies as above. While in [place] had again stroke like symptoms that son reports resulted in the paralysis on the left arm, leg and facial droop with hemineglect. She has been on home hospice since the last 2 months, however the son tells me that she is a full code. They were told that for her glioblastoma she was not a candidate for chemo/radiation. She has had multilpe ED visits for PEG tube dislodgements. The son states that the first time she had pulled it out and thereafter it seems to be dislodging on its own. She has been on home hospice for the past 2 months. Son denies any fever, chills, cough at home.    Today when attempting to feed her tube feed leaked out from stoma and PEG fell out. Last feed was 8PM yesterday    In the ED attending attempted to replace but was unsuccessful x3.

## 2020-07-01 NOTE — ED ADULT NURSE NOTE - INTERVENTIONS DEFINITIONS
[place] to call system/Physically safe environment: no spills, clutter or unnecessary equipment/Instruct patient to call for assistance/Non-slip footwear when patient is off stretcher/Room bathroom lighting operational

## 2020-07-01 NOTE — ED ADULT NURSE NOTE - NS PRO PASSIVE SMOKE EXP
bunionectomy  right 2010, left 2011  H/O ventral hernia repair  2001  hysterectomy  1993  Liver disease  liver resection secondary to nonhodgkin lymphoma 1999  S/P breast lumpectomy  right 1995 Unknown

## 2020-07-01 NOTE — H&P ADULT - ASSESSMENT
60 y/oF with h/o glioblastoma s/p 2 craniotomies, chronic pain  on oxycodone, lorazepam, diabetes who presents from NH with PEG tube dislogdgement. In ED patient stoma closed. Called for admission. I called for GI consult for tomorrow AM 60 y/oF with h/o glioblastoma s/p 2 craniotomies, chronic pain  on oxycodone, lorazepam, diabetes who presents from NH with PEG tube dislogdgement. In ED patient stoma closed. Called for admission. I called for GI consult for tomorrow AM    #PEG tube dislodgement  - patient with some abdominal tenderness on exam  - would admit to any bed  - will obtain CT Abdomen  - AM labs  - hold lovenox  - NPO  - for PEG replacement tomorrow-  - family asking for 'larger size' as they note she has had multiple dislodgements    #chronic pain  - continue morphine 4mg q4 PRN  - resume home meds on discharge    #DM  - not likely to need metformin on discharge  - she is diet controlled at this time  - A1c in the AM    #HTN  - restart amlodipine as clinically indicated    #DVT ppx  - SCD for now

## 2020-07-01 NOTE — ED PROVIDER NOTE - CROS ED NEURO POS
baseline severe left sided weakness, bedbound and memory loss and speech dificulties and swallowing difficulties/DIFFICULTY WALKING / IMBALANCE

## 2020-07-01 NOTE — H&P ADULT - NSHPREVIEWOFSYSTEMS_GEN_ALL_CORE
REVIEW OF SYSTEMS  General: denies weakness, malaise  Skin/Breast: no new rash  Ophthalmologic: no change in vision  ENMT: no dysphagia, throat pain or change in hearing  Respiratory and Thorax: no difficulty breathing or chest pain  Cardiovascular: no palpitations or PND, orthopnea  Gastrointestinal: no abdominal pain, normal bowel movement, no dark stools  Genitourinary: no difficulty urinating, no burning urination  Musculoskeletal: no myalgia/arthrlagia  Neurological: no weakness, numbness, change in gait  Psychiatric: no depression, anxiety  Hematology/Lymphatics: denies easy bruising or bleeding  Endocrine: no polyuria, polydipsia unable to obtain due to patients condition

## 2020-07-02 NOTE — PROCEDURE NOTE - NSPOSTPRCRAD_GEN_A_CORE
results pending,/post-procedure radiography performed post-procedure radiography performed/feeding tube in correct gastric position

## 2020-07-02 NOTE — PROGRESS NOTE ADULT - SUBJECTIVE AND OBJECTIVE BOX
CC: PEG dislodgement and closed stoma (02 Jul 2020 08:38)    INTERVAL HPI/OVERNIGHT EVENTS:    Vital Signs Last 24 Hrs  T(C): 37.1 (02 Jul 2020 08:20), Max: 37.5 (01 Jul 2020 21:59)  T(F): 98.7 (02 Jul 2020 08:20), Max: 99.5 (01 Jul 2020 21:59)  HR: 97 (02 Jul 2020 08:20) (97 - 111)  BP: 146/88 (02 Jul 2020 08:20) (124/79 - 157/95)  BP(mean): 111 (02 Jul 2020 04:55) (111 - 111)  RR: 20 (02 Jul 2020 08:20) (17 - 20)  SpO2: 99% (02 Jul 2020 08:20) (92% - 100%)    PHYSICAL EXAM:  General: Well developed; well nourished; in no acute distress  Eyes: PERRLA, EOMI; conjunctiva and sclera clear  Head: Normocephalic; atraumatic  ENMT: No nasal discharge; airway clear  Neck: Supple; non tender; no masses  Respiratory: No wheezes, rales or rhonchi  Cardiovascular: Regular rate and rhythm. S1 and S2 Normal; No murmurs, gallops or rubs  Gastrointestinal: Soft non-tender non-distended; Normal bowel sounds  Genitourinary: No costovertebral angle tenderness  Extremities: Normal range of motion, No clubbing, cyanosis or edema  Vascular: Peripheral pulses palpable 2+ bilaterally  Neurological: Alert and oriented x4  Skin: Warm and dry. No acute rash  Lymph Nodes: No acute cervical adenopathy  Musculoskeletal: Normal gait, tone, without deformities  Psychiatric: Cooperative and appropriate    I&O's Detail    01 Jul 2020 07:01  -  02 Jul 2020 07:00  --------------------------------------------------------  IN:    dextrose 5% + sodium chloride 0.45%.: 1650 mL  Total IN: 1650 mL    OUT:  Total OUT: 0 mL    Total NET: 1650 mL                        13.1   7.35  )-----------( 176      ( 02 Jul 2020 07:58 )             41.7     02 Jul 2020 08:14    141    |  102    |  5.0    ----------------------------<  117    3.6     |  24.0   |  0.20     Ca    9.2        02 Jul 2020 08:14    TPro  5.7    /  Alb  2.6    /  TBili  0.3    /  DBili  x      /  AST  19     /  ALT  11     /  AlkPhos  127    02 Jul 2020 08:14    PT/INR - ( 01 Jul 2020 16:20 )   PT: 13.8 sec;   INR: 1.20 ratio      PTT - ( 01 Jul 2020 16:20 )  PTT:34.2 sec  CAPILLARY BLOOD GLUCOSE    LIVER FUNCTIONS - ( 02 Jul 2020 08:14 )  Alb: 2.6 g/dL / Pro: 5.7 g/dL / ALK PHOS: 127 U/L / ALT: 11 U/L / AST: 19 U/L / GGT: x           MEDICATIONS  (STANDING):  levETIRAcetam  IVPB 500 milliGRAM(s) IV Intermittent every 12 hours    MEDICATIONS  (PRN):  morphine  - Injectable 4 milliGRAM(s) IV Push every 3 hours PRN Moderate Pain (4 - 6)      RADIOLOGY & ADDITIONAL TESTS: CC: PEG dislodgement and closed stoma (02 Jul 2020 08:38)    INTERVAL HPI/OVERNIGHT EVENTS:  patient tired appearing  stating she is having pain in the abdomen still  discussed with GI about delaying PEG until her pain is improved  patient son states she has had pain since her second PEG placement  reviewed CT which shows no fluid collection  exam largely unchanged  will add lipase  place dobhoff  and start stool regimen  discussed with Son сергей over phone    Vital Signs Last 24 Hrs  T(C): 37.1 (02 Jul 2020 08:20), Max: 37.5 (01 Jul 2020 21:59)  T(F): 98.7 (02 Jul 2020 08:20), Max: 99.5 (01 Jul 2020 21:59)  HR: 97 (02 Jul 2020 08:20) (97 - 111)  BP: 146/88 (02 Jul 2020 08:20) (124/79 - 157/95)  BP(mean): 111 (02 Jul 2020 04:55) (111 - 111)  RR: 20 (02 Jul 2020 08:20) (17 - 20)  SpO2: 99% (02 Jul 2020 08:20) (92% - 100%)    PHYSICAL EXAM:  General: Well developed; well nourished; in no acute distress  Eyes: PERRLA, EOMI; conjunctiva and sclera clear  Head: Normocephalic; atraumatic  ENMT: No nasal discharge; airway clear  Neck: Supple; non tender; no masses  Respiratory: No wheezes, rales or rhonchi  Cardiovascular: Regular rate and rhythm. S1 and S2 Normal; No murmurs, gallops or rubs  Gastrointestinal: Soft non-tender non-distended; Normal bowel sounds  Genitourinary: No costovertebral angle tenderness  Extremities: Normal range of motion, No clubbing, cyanosis or edema  Vascular: Peripheral pulses palpable 2+ bilaterally  Neurological: Alert and oriented x4  Skin: Warm and dry. No acute rash  Lymph Nodes: No acute cervical adenopathy  Musculoskeletal: Normal gait, tone, without deformities  Psychiatric: Cooperative and appropriate    I&O's Detail    01 Jul 2020 07:01  -  02 Jul 2020 07:00  --------------------------------------------------------  IN:    dextrose 5% + sodium chloride 0.45%.: 1650 mL  Total IN: 1650 mL    OUT:  Total OUT: 0 mL    Total NET: 1650 mL                        13.1   7.35  )-----------( 176      ( 02 Jul 2020 07:58 )             41.7     02 Jul 2020 08:14    141    |  102    |  5.0    ----------------------------<  117    3.6     |  24.0   |  0.20     Ca    9.2        02 Jul 2020 08:14    TPro  5.7    /  Alb  2.6    /  TBili  0.3    /  DBili  x      /  AST  19     /  ALT  11     /  AlkPhos  127    02 Jul 2020 08:14    PT/INR - ( 01 Jul 2020 16:20 )   PT: 13.8 sec;   INR: 1.20 ratio      PTT - ( 01 Jul 2020 16:20 )  PTT:34.2 sec  CAPILLARY BLOOD GLUCOSE    LIVER FUNCTIONS - ( 02 Jul 2020 08:14 )  Alb: 2.6 g/dL / Pro: 5.7 g/dL / ALK PHOS: 127 U/L / ALT: 11 U/L / AST: 19 U/L / GGT: x           MEDICATIONS  (STANDING):  levETIRAcetam  IVPB 500 milliGRAM(s) IV Intermittent every 12 hours    MEDICATIONS  (PRN):  morphine  - Injectable 4 milliGRAM(s) IV Push every 3 hours PRN Moderate Pain (4 - 6)      RADIOLOGY & ADDITIONAL TESTS:

## 2020-07-02 NOTE — CONSULT NOTE ADULT - SUBJECTIVE AND OBJECTIVE BOX
HISTORY OF PRESENT ILLNESS: This is a 60y old woman with a past medical history significant for Glioblastoma s/p 2 craniotomies, DM, HLD, and chronic pain,  presents from home with PEG tube dislodgement. On 10/19 patient underwent craniotomy secondary to Gioblastoma, which left her with paralysis of left arm, leg, and facial droop with hemineglect. as per ED records when initially PEG tube placed,  patient pulled PEG tube out and thereafter tub ehas been disloging on its own. She has been on home hospice for the past 2 months. Today patient complaining of diffuse abdominal cramping with no associated alleviating or aggravating factors. Patient also reports nausea and vomiting prior to arrival. Denies any fever, chills, cough, diarrhea, constipation.     Today when attempting to feed her tube feed leaked out from stoma and PEG fell out. Last feed was 8PM yesterday    ROS: A 14-point review of systems was completed and was otherwise negative save what was reported in the HPI.    PAST MEDICAL/SURGICAL HISTORY:  Glioblastoma  H/O brain surgery    SOCIAL HISTORY:  - TOBACCO: Denies  - ALCOHOL: Denies  - ILLICIT DRUG USE: Denies    FAMILY HISTORY:  No known history of gastrointestinal or liver disease;  No pertinent family history in first degree relatives      HOME MEDICATIONS:  amLODIPine 10 mg oral tablet: 1 tab(s) orally once a day (01 Jul 2020 18:24)  Keppra 100 mg/mL oral solution: 5 milliliter(s) orally 2 times a day (01 Jul 2020 18:24)  LORazepam 1 mg oral tablet: 1-3 tab(s) orally 3 times a day (01 Jul 2020 18:25)  metFORMIN:  (01 Jul 2020 18:25)  oxyCODONE 10 mg oral tablet: 1-4 tab(s) orally every 4 hours PRN (01 Jul 2020 18:24)  simvastatin 20 mg oral tablet: 1 tab(s) orally once a day (at bedtime) (01 Jul 2020 18:25)    INPATIENT MEDICATIONS:  MEDICATIONS  (STANDING):  dextrose 5% + sodium chloride 0.45%. 1000 milliLiter(s) (150 mL/Hr) IV Continuous <Continuous>  levETIRAcetam  IVPB 500 milliGRAM(s) IV Intermittent every 12 hours    MEDICATIONS  (PRN):  morphine  - Injectable 4 milliGRAM(s) IV Push every 3 hours PRN Moderate Pain (4 - 6)    ALLERGIES:  No Known Allergies    T(C): 37.1 (07-02-20 @ 08:20), Max: 37.5 (07-01-20 @ 21:59)  HR: 97 (07-02-20 @ 08:20) (97 - 111)  BP: 146/88 (07-02-20 @ 08:20) (124/79 - 157/95)  RR: 20 (07-02-20 @ 08:20) (17 - 20)  SpO2: 99% (07-02-20 @ 08:20) (92% - 100%)    07-01-20 @ 07:01  -  07-02-20 @ 07:00  --------------------------------------------------------  IN: 1650 mL / OUT: 0 mL / NET: 1650 mL        PHYSICAL EXAM:  Constitutional: Well-developed, obese, in no apparent distress  Eyes: Sclerae anicteric, conjunctivae normal  ENMT: Mucus membranes moist, no oropharyngeal thrush noted  Neck: No thyroid nodules appreciated, no significant cervical or supraclavicular lymphadenopathy  Respiratory: Breathing nonlabored; clear to auscultation  Cardiovascular: Regular rate and rhythm  Gastrointestinal: Soft,  diffusely tender to palpation, nondistended, normoactive bowel sounds; no hepatosplenomegaly appreciated; no rebound tenderness or involuntary guarding  Extremities: No clubbing, cyanosis or edema  Neurological: Awake and alert.   Skin: No jaundice  Lymph Nodes: No significant lymphadenopathy  Musculoskeletal  left sided weakness  Psychiatric: Affect and mood appropriate      LABS:             13.1   7.35  )-----------( 176      ( 07-02 @ 07:58 )             41.7                12.6   9.84  )-----------( 209      ( 07-01 @ 16:20 )             38.5       PT/INR - ( 01 Jul 2020 16:20 )   PT: 13.8 sec;   INR: 1.20 ratio         PTT - ( 01 Jul 2020 16:20 )  PTT:34.2 sec  07-01    145  |  102  |  8.0  ----------------------------<  108<H>  3.2<L>   |  30.0<H>  |  0.28<L>    Ca    9.2      01 Jul 2020 16:20    TPro  5.7<L>  /  Alb  3.1<L>  /  TBili  0.3<L>  /  DBili  x   /  AST  16  /  ALT  12  /  AlkPhos  126<H>  07-01    LIVER FUNCTIONS - ( 01 Jul 2020 16:20 )  Alb: 3.1 g/dL / Pro: 5.7 g/dL / ALK PHOS: 126 U/L / ALT: 12 U/L / AST: 16 U/L / GGT: x           IMAGING: I personally reviewed the CT of abdoment and I agree with the radiologist's interpretation as described below:  COMPARISON: CT abdomen and pelvis 6/15/2020    PROCEDURE:   CT of the Abdomen and Pelvis was performed without intravenous contrast.   Intravenous contrast: None.  Oral contrast: positive contrast was administered.  Sagittal and coronal reformats were performed.    FINDINGS: Evaluation is limited by lack of intravenous contrast and respiratory motion artifact.    LOWER CHEST: New patchy left basilar groundglass opacities.    LIVER: Within normal limits.  BILE DUCTS: Normal caliber.  GALLBLADDER: Within normal limits.  SPLEEN: Within normal limits.  PANCREAS: Within normal limits.  ADRENALS: Within normal limits.  KIDNEYS/URETERS: Left renal cyst. No hydronephrosis..    BLADDER: Within normal limits.  REPRODUCTIVE ORGANS: Hysterectomy.    BOWEL: No bowel obstruction. Appendix is normal.  PERITONEUM: No ascites.  VESSELS: Within normal limits.  RETROPERITONEUM/LYMPH NODES: No lymphadenopathy.    ABDOMINAL WALL: Tract from prior PEG tube which as been removed in the interim.  BONES: Degenerative changes.    IMPRESSION:     Nonspecific left basilar groundglass opacities may be infectious versus inflammatory.    No acute abdominal pathology within the limits of this noncontrast study. HISTORY OF PRESENT ILLNESS: This is a 60-year-old woman with a past medical history significant for glioblastoma s/p 2 craniotomies, DM, HLD, and chronic pain, who presented to the ED from home with a PEG tube dislodgement.  PEG tube was reportedly placed in October 2019 and has been dislodged multiple times.  On one occasion, she reportedly self-removed it and since, the tube has dislodged multiple times on its own.  She has been on home hospice for the past 2 months but is full code per son.  The family has requested that a larger caliber tube be placed, as it has been leaking prior to its recent dislodge.  Today, the is patient reporting moderately severe diffuse abdominal pain, describing it as cramping with no associated alleviating or aggravating factors.  Patient also reports nausea and vomiting prior to arrival.  No reported fever, chills, cough, diarrhea, or constipation.     ROS: A 14-point review of systems was completed and was otherwise negative save what was reported in the HPI.    PAST MEDICAL/SURGICAL HISTORY:  Glioblastoma  H/O brain surgery    SOCIAL HISTORY:  - TOBACCO: Denies  - ALCOHOL: Denies  - ILLICIT DRUG USE: Denies    FAMILY HISTORY:  No known history of gastrointestinal or liver disease;  No pertinent family history in first degree relatives      HOME MEDICATIONS:  amLODIPine 10 mg oral tablet: 1 tab(s) orally once a day (01 Jul 2020 18:24)  Keppra 100 mg/mL oral solution: 5 milliliter(s) orally 2 times a day (01 Jul 2020 18:24)  LORazepam 1 mg oral tablet: 1-3 tab(s) orally 3 times a day (01 Jul 2020 18:25)  metFORMIN:  (01 Jul 2020 18:25)  oxyCODONE 10 mg oral tablet: 1-4 tab(s) orally every 4 hours PRN (01 Jul 2020 18:24)  simvastatin 20 mg oral tablet: 1 tab(s) orally once a day (at bedtime) (01 Jul 2020 18:25)    INPATIENT MEDICATIONS:  MEDICATIONS  (STANDING):  dextrose 5% + sodium chloride 0.45%. 1000 milliLiter(s) (150 mL/Hr) IV Continuous <Continuous>  levETIRAcetam  IVPB 500 milliGRAM(s) IV Intermittent every 12 hours    MEDICATIONS  (PRN):  morphine  - Injectable 4 milliGRAM(s) IV Push every 3 hours PRN Moderate Pain (4 - 6)    ALLERGIES:  No Known Allergies    T(C): 37.1 (07-02-20 @ 08:20), Max: 37.5 (07-01-20 @ 21:59)  HR: 97 (07-02-20 @ 08:20) (97 - 111)  BP: 146/88 (07-02-20 @ 08:20) (124/79 - 157/95)  RR: 20 (07-02-20 @ 08:20) (17 - 20)  SpO2: 99% (07-02-20 @ 08:20) (92% - 100%)    07-01-20 @ 07:01  -  07-02-20 @ 07:00  --------------------------------------------------------  IN: 1650 mL / OUT: 0 mL / NET: 1650 mL        PHYSICAL EXAM:  Constitutional: Well-developed, obese -American woman in no apparent distress  Eyes: Sclerae anicteric, conjunctivae normal  ENMT: Mucus membranes moist, no oropharyngeal thrush noted  Neck: No thyroid nodules appreciated, no significant cervical or supraclavicular lymphadenopathy  Respiratory: Breathing nonlabored; clear to auscultation  Cardiovascular: Regular rate and rhythm  Gastrointestinal: Soft,  diffusely tender to palpation, nondistended, normoactive bowel sounds; no hepatosplenomegaly appreciated; no rebound tenderness or involuntary guarding  Extremities: No clubbing, cyanosis or edema  Neurological: Awake and alert.   Skin: No jaundice  Lymph Nodes: No significant lymphadenopathy  Psychiatric: Affect and mood appropriate      LABS:             13.1   7.35  )-----------( 176      ( 07-02 @ 07:58 )             41.7                12.6   9.84  )-----------( 209      ( 07-01 @ 16:20 )             38.5       PT/INR - ( 01 Jul 2020 16:20 )   PT: 13.8 sec;   INR: 1.20 ratio         PTT - ( 01 Jul 2020 16:20 )  PTT:34.2 sec  07-01    145  |  102  |  8.0  ----------------------------<  108<H>  3.2<L>   |  30.0<H>  |  0.28<L>    Ca    9.2      01 Jul 2020 16:20    TPro  5.7<L>  /  Alb  3.1<L>  /  TBili  0.3<L>  /  DBili  x   /  AST  16  /  ALT  12  /  AlkPhos  126<H>  07-01    LIVER FUNCTIONS - ( 01 Jul 2020 16:20 )  Alb: 3.1 g/dL / Pro: 5.7 g/dL / ALK PHOS: 126 U/L / ALT: 12 U/L / AST: 16 U/L / GGT: x           IMAGING: I personally reviewed the CT of abdoment and I agree with the radiologist's interpretation as described below:  COMPARISON: CT abdomen and pelvis 6/15/2020    PROCEDURE:   CT of the Abdomen and Pelvis was performed without intravenous contrast.   Intravenous contrast: None.  Oral contrast: positive contrast was administered.  Sagittal and coronal reformats were performed.    FINDINGS: Evaluation is limited by lack of intravenous contrast and respiratory motion artifact.    LOWER CHEST: New patchy left basilar groundglass opacities.    LIVER: Within normal limits.  BILE DUCTS: Normal caliber.  GALLBLADDER: Within normal limits.  SPLEEN: Within normal limits.  PANCREAS: Within normal limits.  ADRENALS: Within normal limits.  KIDNEYS/URETERS: Left renal cyst. No hydronephrosis..    BLADDER: Within normal limits.  REPRODUCTIVE ORGANS: Hysterectomy.    BOWEL: No bowel obstruction. Appendix is normal.  PERITONEUM: No ascites.  VESSELS: Within normal limits.  RETROPERITONEUM/LYMPH NODES: No lymphadenopathy.    ABDOMINAL WALL: Tract from prior PEG tube which as been removed in the interim.  BONES: Degenerative changes.    IMPRESSION:     Nonspecific left basilar groundglass opacities may be infectious versus inflammatory.    No acute abdominal pathology within the limits of this noncontrast study.

## 2020-07-02 NOTE — PROGRESS NOTE ADULT - ASSESSMENT
60 y/oF with h/o glioblastoma s/p 2 craniotomies, chronic pain  on oxycodone, lorazepam, diabetes who presents from NH with PEG tube dislogdgement. In ED patient stoma closed. Called for admission. I called for GI consult for tomorrow AM    #PEG tube dislodgement  - place dobhoff and start tube feeds  - discussed with GI PEG placement likely next week if abdominal pain improved    #abdominal pain  - son reports this is ongoing since second PEG was placed  - patient  to touch  - no pus from stoma  - CT abdomen shows no acute pathology  - ordered lipase  - will hold off on replacing PEG and if improved by weekend can look to replace possibly Monday  - laxatives to help patient have BM    #chronic pain  - continue morphine 4mg q4 PRN    #DM  - not likely to need metformin on discharge  - she is diet controlled at this time  - A1c in the AM    #HTN  - restart amlodipine if clinically indicated    #DVT ppx  - SCD for now 60 y/oF with h/o glioblastoma s/p 2 craniotomies, chronic pain  on oxycodone, lorazepam, diabetes who presents from NH with PEG tube dislogdgement. In ED patient stoma closed. Called for admission. GI following.    #PEG tube dislodgement  - place dobhoff and start tube feeds  - discussed with GI PEG placement likely next week if abdominal pain improved    #abdominal pain  - son reports this is ongoing since second PEG was placed  - patient  to touch  - no pus from stoma  - CT abdomen shows no acute pathology  - ordered lipase  - will hold off on replacing PEG and if improved by weekend can look to replace possibly Monday  - laxatives to help patient have BM    #chronic pain  - continue morphine 4mg q4 PRN    #DM  - not likely to need metformin on discharge  - she is diet controlled at this time  - A1c in the AM    #HTN  - restart amlodipine if clinically indicated    #DVT ppx  - SCD for now

## 2020-07-02 NOTE — CHART NOTE - NSCHARTNOTEFT_GEN_A_CORE
Called by RN bc Pt desat 73% on RA. Pt started on 2LNC, O2 sat 85%. Pt then placed on 3LNC, O2 sat 94%. Ordered  & repeat Covid PCR. Prior Covid PCR negative (7/1). As per abdominal CT (7/1) ground glass opacities noted in LLL, questionable infectious process vs inflammatory process. Discussed with Dr. Calzada. Will continue to monitor.

## 2020-07-02 NOTE — CHART NOTE - NSCHARTNOTEFT_GEN_A_CORE
Pt is a 59 yo female w/Hx of glioblastoma s/p 2 craniotomies, chronic pain on oxycodone, lorazepam, DM admitted to [facility] due to PEG tube dislodgment. Pt was seen at bedside by Hospitalist and PA. Pt required NGT placement until GI can replace PEG tube. Pt at time of procedure was lethargic and minimally responsive. Discussed findings and plan of care w/Dr. Calzada, CT head ordered, EEG ordered, decreased Keppra dose. Pt's son also at bedside, as per Pt's son lethargy is fairly new began approximately 1wk ago. Also discussed with Pt's son plan of care and is in full agreement. NGT was placed with no complications, STAT CXR ordered to confirm placement, results pending. Pt is a 59 yo female w/Hx of glioblastoma s/p 2 craniotomies, chronic pain on oxycodone, lorazepam, DM admitted to [facility] due to PEG tube dislodgment. Pt was seen at bedside by Hospitalist and PA. Pt required NGT placement until GI can replace PEG tube. Pt at time of procedure was lethargic and minimally responsive. Discussed findings and plan of care w/Dr. Calzada, CT head ordered, EEG ordered, decreased Keppra dose. Pt's son also at bedside, as per Pt's son lethargy is fairly new began approximately 1wk ago. Also discussed with Pt's son plan of care and is in full agreement. NGT was placed with no complications, Pt tolerated procedure. STAT CXR ordered to confirm placement. NGT in proper place.

## 2020-07-03 NOTE — EEG REPORT - NS EEG TEXT BOX
[facility]   COMPREHENSIVE EPILEPSY CENTER   REPORT OF ROUTINE EEG     [facility]: [address], [place] [address], Ph#: [phone]  [facility]: [id number], [place] [address], Ph#: [phone]  Office: [address] [place] [address], [facility] [address], [place] [address] Ph#: [phone]    Patient Name: JANAY GUERRERO  Age and : 60y (62)  MRN #: [id number]  Location: [place] [address]  Referring Physician: Tavo Calzada    Study Date: 20    _____________________________________________________________  TECHNICAL INFORMATION    Placement and Labeling of Electrodes:  The EEG was performed utilizing 20 channels referential EEG connections (coronal over temporal over parasagittal montage) using all standard 10-20 electrode placements with EKG.  Recording was at a sampling rate of 256 samples per second per channel. Benjamín and seizure detection algorithms were utilized.    _____________________________________________________________  HISTORY    Patient is a 60y old  Female who presents with a chief complaint of PEG dislodgement and closed stoma (2020 13:50)    PERTINENT MEDICATION:  MEDICATIONS  (STANDING):  levETIRAcetam  IVPB 250 milliGRAM(s) IV Intermittent every 12 hours  polyethylene glycol 3350 Oral Powder - Peds 17 Gram(s) Oral daily  senna 2 Tablet(s) Oral at bedtime    _____________________________________________________________  STUDY INTERPRETATION    Findings: The background was continuous, spontaneously variable and reactive.     Background predominantly consisted of theta and delta activities. No posterior dominant rhythm seen.    Background Slowing:  Background predominantly consisted of theta, delta and faster activities.    Focal Slowing:   Continuos focal slowing RFT region    Sleep Background:  Drowsiness and stage II sleep transients were not recorded.    Other Non-Epileptiform Findings:  Diffuse excess beta activity.  Breach effect in  R FT region characterized by higher amplitude, sharply contoured waves and fast activities.    Interictal Epileptiform Activity:   None were present.    Events:  Clinical events: None recorded.  Seizures: None recorded.    Activation Procedures:   Hyperventilation was not performed.    Photic stimulation was performed and did not elicit any abnormality.     Artifacts:  Intermittent myogenic and movement artifacts were noted.    ECG:  The heart rate on single channel ECG was predominantly between 50-60 BPM.    _____________________________________________________________  EEG SUMMARY/CLASSIFICATION    Abnormal EEG in an unresponsive  patient.  -continous focal slwoing RFT region  - Moderate generalized slowing.    _____________________________________________________________  EEG IMPRESSION/CLINICAL CORRELATE    Abnormal EEG study.    Structural abnormality in the RFT region  Moderate nonspecific diffuse or multifocal cerebral dysfunction.   Skull defect in the RFT region  Diffuse excess beta activity may be seen with medication use such as benzodiazepines or barbiturates.      Silvina Smith MD  Epilepsy Attending,   [facility] [place]

## 2020-07-03 NOTE — PROGRESS NOTE ADULT - SUBJECTIVE AND OBJECTIVE BOX
CC: PEG dislodgement and closed stoma (02 Jul 2020 08:38)    INTERVAL HPI/OVERNIGHT EVENTS:      Vital Signs Last 24 Hrs  T(C): 36.9 (03 Jul 2020 08:00), Max: 37.1 (02 Jul 2020 08:20)  T(F): 98.4 (03 Jul 2020 08:00), Max: 98.7 (02 Jul 2020 08:20)  HR: 102 (03 Jul 2020 08:00) (97 - 120)  BP: 128/80 (03 Jul 2020 08:00) (92/56 - 146/88)  BP(mean): --  RR: 18 (03 Jul 2020 08:00) (18 - 20)  SpO2: 94% (03 Jul 2020 08:00) (88% - 99%)  SpO2: 99% (02 Jul 2020 08:20) (92% - 100%)    PHYSICAL EXAM:  General: in no acute distress; uncomfortable from pain  Eyes: PERRLA, EOMI; conjunctiva and sclera clear  Neck: Supple; non tender; no masses  Respiratory: No wheezes, rales or rhonchi  Cardiovascular: Regular rate and rhythm. S1 and S2 Normal; No murmurs, gallops or rubs  Gastrointestinal: Soft non-tender non-distended; Normal bowel sounds  Extremities: Normal range of motion, No clubbing, cyanosis or edema  Vascular: Peripheral pulses palpable 2+ bilaterally  Neurological: Alert and unable to assess if oriented; paralysis of left side, left hemineglect, left facial droop    I&O's Detail    01 Jul 2020 07:01  -  02 Jul 2020 07:00  --------------------------------------------------------  IN:    dextrose 5% + sodium chloride 0.45%.: 1650 mL  Total IN: 1650 mL    OUT:  Total OUT: 0 mL    Total NET: 1650 mL                        13.1   7.35  )-----------( 176      ( 02 Jul 2020 07:58 )             41.7     02 Jul 2020 08:14    141    |  102    |  5.0    ----------------------------<  117    3.6     |  24.0   |  0.20     Ca    9.2        02 Jul 2020 08:14    TPro  5.7    /  Alb  2.6    /  TBili  0.3    /  DBili  x      /  AST  19     /  ALT  11     /  AlkPhos  127    02 Jul 2020 08:14    PT/INR - ( 01 Jul 2020 16:20 )   PT: 13.8 sec;   INR: 1.20 ratio      PTT - ( 01 Jul 2020 16:20 )  PTT:34.2 sec  CAPILLARY BLOOD GLUCOSE    LIVER FUNCTIONS - ( 02 Jul 2020 08:14 )  Alb: 2.6 g/dL / Pro: 5.7 g/dL / ALK PHOS: 127 U/L / ALT: 11 U/L / AST: 19 U/L / GGT: x           MEDICATIONS  (STANDING):  levETIRAcetam  IVPB 500 milliGRAM(s) IV Intermittent every 12 hours    MEDICATIONS  (PRN):  morphine  - Injectable 4 milliGRAM(s) IV Push every 3 hours PRN Moderate Pain (4 - 6)    RADIOLOGY & ADDITIONAL TESTS: CC: PEG dislodgement and closed stoma (02 Jul 2020 08:38)    INTERVAL HPI/OVERNIGHT EVENTS:  patient without acute events  appears stable  no abdominal tenderness  now on 1 LPM of oxygen and 99%    Vital Signs Last 24 Hrs  T(C): 36.9 (03 Jul 2020 08:00), Max: 37.1 (02 Jul 2020 08:20)  T(F): 98.4 (03 Jul 2020 08:00), Max: 98.7 (02 Jul 2020 08:20)  HR: 102 (03 Jul 2020 08:00) (97 - 120)  BP: 128/80 (03 Jul 2020 08:00) (92/56 - 146/88)  BP(mean): --  RR: 18 (03 Jul 2020 08:00) (18 - 20)  SpO2: 94% (03 Jul 2020 08:00) (88% - 99%)  SpO2: 99% (02 Jul 2020 08:20) (92% - 100%)    PHYSICAL EXAM:  General: in no acute distress; uncomfortable from pain  Eyes: PERRLA, EOMI; conjunctiva and sclera clear  Neck: Supple; non tender; no masses  Respiratory: No wheezes, rales or rhonchi  Cardiovascular: Regular rate and rhythm. S1 and S2 Normal; No murmurs, gallops or rubs  Gastrointestinal: Soft non-tender non-distended; Normal bowel sounds  Extremities: Normal range of motion, No clubbing, cyanosis or edema  Vascular: Peripheral pulses palpable 2+ bilaterally  Neurological: Alert and unable to assess if oriented; paralysis of left side, left hemineglect, left facial droop                          12.6   10.83 )-----------( 189      ( 03 Jul 2020 05:35 )             39.0     07-03    143  |  102  |  8.0  ----------------------------<  170<H>  3.7   |  27.0  |  0.38<L>    Ca    9.8      03 Jul 2020 07:17    TPro  5.5<L>  /  Alb  2.8<L>  /  TBili  0.4  /  DBili  x   /  AST  26  /  ALT  15  /  AlkPhos  126<H>  07-03    < from: CT Head No Cont (07.02.20 @ 16:50) >  IMPRESSION: Slightly decreased postoperative air subjacent to the right frontal craniotomy flap compared with 6/16/2020. No change in small residual postoperative collection or significant vasogenic edema without significant mass effect.  < end of copied text >    RADIOLOGY & ADDITIONAL TESTS:

## 2020-07-03 NOTE — PROGRESS NOTE ADULT - SUBJECTIVE AND OBJECTIVE BOX
Pt seen and examined f/u for dislodged G tube    Patient is tolerating NG feeds . She is awake and in no distress but minimally communicative.    REVIEW OF SYSTEMS: unable to obtain    .    MEDICATIONS:  MEDICATIONS  (STANDING):  levETIRAcetam  IVPB 250 milliGRAM(s) IV Intermittent every 12 hours  polyethylene glycol 3350 Oral Powder - Peds 17 Gram(s) Oral daily  senna 2 Tablet(s) Oral at bedtime    MEDICATIONS  (PRN):  morphine  - Injectable 4 milliGRAM(s) IV Push every 3 hours PRN Moderate Pain (4 - 6)      Allergies    No Known Allergies    Intolerances        Vital Signs Last 24 Hrs  T(C): 36.9 (03 Jul 2020 08:00), Max: 37.1 (03 Jul 2020 03:25)  T(F): 98.4 (03 Jul 2020 08:00), Max: 98.7 (03 Jul 2020 03:25)  HR: 102 (03 Jul 2020 08:00) (102 - 120)  BP: 128/80 (03 Jul 2020 08:00) (92/56 - 130/94)  BP(mean): --  RR: 18 (03 Jul 2020 08:00) (18 - 20)  SpO2: 94% (03 Jul 2020 08:00) (88% - 95%)      PHYSICAL EXAM:    General: overweight female, well nourished; in no acute distress  HEENT: MMM, conjunctiva and sclera clear, NG tube present  Gastrointestinal:Abdomen: Soft non-tender non-distended; Normal bowel sounds; No hepatosplenomegaly  Extremities: no cyanosis, clubbing or edema.  Skin: Warm and dry. No obvious rash    LABS:      CBC Full  -  ( 03 Jul 2020 05:35 )  WBC Count : 10.83 K/uL  RBC Count : 4.16 M/uL  Hemoglobin : 12.6 g/dL  Hematocrit : 39.0 %  Platelet Count - Automated : 189 K/uL  Mean Cell Volume : 93.8 fl  Mean Cell Hemoglobin : 30.3 pg  Mean Cell Hemoglobin Concentration : 32.3 gm/dL  Auto Neutrophil # : x  Auto Lymphocyte # : x  Auto Monocyte # : x  Auto Eosinophil # : x  Auto Basophil # : x  Auto Neutrophil % : x  Auto Lymphocyte % : x  Auto Monocyte % : x  Auto Eosinophil % : x  Auto Basophil % : x    07-03    143  |  102  |  8.0  ----------------------------<  170<H>  3.7   |  27.0  |  0.38<L>    Ca    9.8      03 Jul 2020 07:17    TPro  5.5<L>  /  Alb  2.8<L>  /  TBili  0.4  /  DBili  x   /  AST  26  /  ALT  15  /  AlkPhos  126<H>  07-03    PT/INR - ( 01 Jul 2020 16:20 )   PT: 13.8 sec;   INR: 1.20 ratio         PTT - ( 01 Jul 2020 16:20 )  PTT:34.2 sec                  RADIOLOGY & ADDITIONAL STUDIES (The following images were personally reviewed):  < from: CT Abdomen and Pelvis No Cont (07.01.20 @ 20:58) >  PROCEDURE:   CT of the Abdomen and Pelvis was performed without intravenous contrast.   Intravenous contrast: None.  Oral contrast: positive contrast was administered.  Sagittal and coronal reformats were performed.    FINDINGS: Evaluation is limited by lack of intravenous contrast and respiratory motion artifact.    LOWER CHEST: New patchy left basilar groundglass opacities.    LIVER: Within normal limits.  BILE DUCTS: Normal caliber.  GALLBLADDER: Within normal limits.  SPLEEN: Within normal limits.  PANCREAS: Within normal limits.  ADRENALS: Within normal limits.  KIDNEYS/URETERS: Left renal cyst. No hydronephrosis..    BLADDER: Within normal limits.  REPRODUCTIVE ORGANS: Hysterectomy.    BOWEL: No bowel obstruction. Appendix is normal.  PERITONEUM: No ascites.  VESSELS: Within normal limits.  RETROPERITONEUM/LYMPH NODES: No lymphadenopathy.    ABDOMINAL WALL: Tract from prior PEG tube which as been removed in the interim.  BONES: Degenerative changes.    IMPRESSION:     Nonspecific left basilar groundglass opacities may be infectious versus inflammatory.    No acute abdominal pathology within the limits of this noncontrast study.                HERBERT IBARRA M.D., ATTENDING RADIOLOGIST  This document has been electronically signed. Jul 1 2020  9:08PM                < end of copied text > Pt seen and examined f/u for dislodged G tube    NG tube feeds not yet started as pump not available. No further vomiting.  She is awake and in no distress but minimally communicative.    REVIEW OF SYSTEMS: unable to obtain    .    MEDICATIONS:  MEDICATIONS  (STANDING):  levETIRAcetam  IVPB 250 milliGRAM(s) IV Intermittent every 12 hours  polyethylene glycol 3350 Oral Powder - Peds 17 Gram(s) Oral daily  senna 2 Tablet(s) Oral at bedtime    MEDICATIONS  (PRN):  morphine  - Injectable 4 milliGRAM(s) IV Push every 3 hours PRN Moderate Pain (4 - 6)      Allergies    No Known Allergies    Intolerances        Vital Signs Last 24 Hrs  T(C): 36.9 (03 Jul 2020 08:00), Max: 37.1 (03 Jul 2020 03:25)  T(F): 98.4 (03 Jul 2020 08:00), Max: 98.7 (03 Jul 2020 03:25)  HR: 102 (03 Jul 2020 08:00) (102 - 120)  BP: 128/80 (03 Jul 2020 08:00) (92/56 - 130/94)  BP(mean): --  RR: 18 (03 Jul 2020 08:00) (18 - 20)  SpO2: 94% (03 Jul 2020 08:00) (88% - 95%)      PHYSICAL EXAM:    General: overweight female, well nourished; in no acute distress  HEENT: MMM, conjunctiva and sclera clear, NG tube present  Gastrointestinal:Abdomen: Soft non-tender non-distended; Normal bowel sounds; No hepatosplenomegaly  Extremities: no cyanosis, clubbing or edema.  Skin: Warm and dry. No obvious rash    LABS:      CBC Full  -  ( 03 Jul 2020 05:35 )  WBC Count : 10.83 K/uL  RBC Count : 4.16 M/uL  Hemoglobin : 12.6 g/dL  Hematocrit : 39.0 %  Platelet Count - Automated : 189 K/uL  Mean Cell Volume : 93.8 fl  Mean Cell Hemoglobin : 30.3 pg  Mean Cell Hemoglobin Concentration : 32.3 gm/dL  Auto Neutrophil # : x  Auto Lymphocyte # : x  Auto Monocyte # : x  Auto Eosinophil # : x  Auto Basophil # : x  Auto Neutrophil % : x  Auto Lymphocyte % : x  Auto Monocyte % : x  Auto Eosinophil % : x  Auto Basophil % : x    07-03    143  |  102  |  8.0  ----------------------------<  170<H>  3.7   |  27.0  |  0.38<L>    Ca    9.8      03 Jul 2020 07:17    TPro  5.5<L>  /  Alb  2.8<L>  /  TBili  0.4  /  DBili  x   /  AST  26  /  ALT  15  /  AlkPhos  126<H>  07-03    PT/INR - ( 01 Jul 2020 16:20 )   PT: 13.8 sec;   INR: 1.20 ratio         PTT - ( 01 Jul 2020 16:20 )  PTT:34.2 sec                  RADIOLOGY & ADDITIONAL STUDIES (The following images were personally reviewed):  < from: CT Abdomen and Pelvis No Cont (07.01.20 @ 20:58) >  PROCEDURE:   CT of the Abdomen and Pelvis was performed without intravenous contrast.   Intravenous contrast: None.  Oral contrast: positive contrast was administered.  Sagittal and coronal reformats were performed.    FINDINGS: Evaluation is limited by lack of intravenous contrast and respiratory motion artifact.    LOWER CHEST: New patchy left basilar groundglass opacities.    LIVER: Within normal limits.  BILE DUCTS: Normal caliber.  GALLBLADDER: Within normal limits.  SPLEEN: Within normal limits.  PANCREAS: Within normal limits.  ADRENALS: Within normal limits.  KIDNEYS/URETERS: Left renal cyst. No hydronephrosis..    BLADDER: Within normal limits.  REPRODUCTIVE ORGANS: Hysterectomy.    BOWEL: No bowel obstruction. Appendix is normal.  PERITONEUM: No ascites.  VESSELS: Within normal limits.  RETROPERITONEUM/LYMPH NODES: No lymphadenopathy.    ABDOMINAL WALL: Tract from prior PEG tube which as been removed in the interim.  BONES: Degenerative changes.    IMPRESSION:     Nonspecific left basilar groundglass opacities may be infectious versus inflammatory.    No acute abdominal pathology within the limits of this noncontrast study.                HERBERT IBARRA M.D., ATTENDING RADIOLOGIST  This document has been electronically signed. Jul 1 2020  9:08PM                < end of copied text >

## 2020-07-03 NOTE — PROGRESS NOTE ADULT - ASSESSMENT
60 y/oF with h/o glioblastoma s/p 2 craniotomies, chronic pain  on oxycodone, lorazepam, diabetes who presents from NH with PEG tube dislogdgement. In ED patient stoma closed. Called for admission. GI following.    #PEG tube dislodgement  - NGT in place  - starting tube feeds    #acute hypoxic resp failure - on 3 LPM - etiology unclear  - patient on O2  - CXR from yesterday shows no active disease  - check CXR today  - can add D-dimer  - hold IV fluids    #acute metabolic encephalopathy  - patient yesterday encephalopathic and hypotensive  - likely from morphine  - CT head without significant change from prior exam  - EEG pending  - keppra dose decreased to 250mg BID    #hypotension - likely distributive due to morphine yesterday  - now improved after fluids  - hold fluids as above    #abdominal pain - improved  - lipase normal  - CT abdomen unremarkable  - will hold off on replacing PEG and if improved by weekend can look to replace possibly Monday  - laxatives to help patient have BM    #chronic pain  - continue morphine 4mg q4 PRN    #DM - A1c 5.6  - no FS or ISS for now  - not likely to need metformin on discharge  - she is diet controlled at this time given she is on glucerna tube feeds  - A1c 5.6    #HTN  - restart amlodipine if clinically indicated    #DVT ppx  - SCD for now 60 y/oF with h/o glioblastoma s/p 2 craniotomies, chronic pain  on oxycodone, lorazepam, diabetes who presents from NH with PEG tube dislogdgement. In ED patient stoma closed. Called for admission. GI following.    #PEG tube dislodgement  - NGT in place  - starting tube feeds    #acute hypoxic resp failure - improved  - patient on O2  - CXR from yesterday shows no active disease  - O2 is improving at this time  - can add D-dimer  - hold IV fluids    #acute metabolic encephalopathy - improved  - likely from morphine  - CT head without significant change from prior exam  - keppra dose decreased to 250mg BID  - continue to monitor for now    #hypotension - likely distributive due to morphine yesterday - improved  - now improved after fluids  - hold fluids as above    #abdominal pain - resolved  - lipase normal  - CT abdomen unremarkable  - will hold off on replacing PEG and if improved by weekend can look to replace possibly Monday  - laxatives to help patient have BM    #chronic pain  - continue morphine 4mg q4 PRN    #DM - A1c 5.6  - no FS or ISS for now  - not likely to need metformin on discharge  - she is diet controlled at this time given she is on glucerna tube feeds  - A1c 5.6    #HTN  - restart amlodipine if clinically indicated    #DVT ppx  - SCD for now

## 2020-07-04 NOTE — DIETITIAN INITIAL EVALUATION ADULT. - PERTINENT MEDS FT
MEDICATIONS  (STANDING):  levETIRAcetam  IVPB 250 milliGRAM(s) IV Intermittent every 12 hours  polyethylene glycol 3350 Oral Powder - Peds 17 Gram(s) Oral daily  senna 2 Tablet(s) Oral at bedtime    MEDICATIONS  (PRN):  morphine  - Injectable 4 milliGRAM(s) IV Push every 3 hours PRN Moderate Pain (4 - 6)

## 2020-07-04 NOTE — DIETITIAN INITIAL EVALUATION ADULT. - MALNUTRITION
moderate, chronic limited NFPE: mild muscle loss of temples, clavicles, shoulders; mild fat loss of orbitals

## 2020-07-04 NOTE — DIETITIAN INITIAL EVALUATION ADULT. - ETIOLOGY
related to inability to meet increased nutrient needs associated with glioblastoma s/p 2 craniotomies and recurrent PEG dislodgement

## 2020-07-04 NOTE — PROGRESS NOTE ADULT - SUBJECTIVE AND OBJECTIVE BOX
Pt seen and examined dislodged G tube    This AM appears more alert and conversant. Getting bolus fed thru NG which she is tolerating    REVIEW OF SYSTEMS:    CONSTITUTIONAL: No fever, weight loss, or fatigue  EYES: No eye pain, visual disturbances, or discharge  ENMT:  No difficulty hearing, tinnitus, vertigo; No sinus or throat pain  RESPIRATORY: No cough, wheezing, chills or hemoptysis; No shortness of breath  CARDIOVASCULAR: No chest pain, palpitations, dizziness, or leg swelling  GASTROINTESTINAL: No abdominal or epigastric pain. No nausea, vomiting, or hematemesis; No diarrhea or constipation. No melena or hematochezia.    MEDICATIONS:  MEDICATIONS  (STANDING):  levETIRAcetam  Solution 500 milliGRAM(s) Oral two times a day  polyethylene glycol 3350 Oral Powder - Peds 17 Gram(s) Oral daily  senna 2 Tablet(s) Oral at bedtime    MEDICATIONS  (PRN):  morphine  - Injectable 4 milliGRAM(s) IV Push every 3 hours PRN Moderate Pain (4 - 6)      Allergies    No Known Allergies    Intolerances        Vital Signs Last 24 Hrs  T(C): 36.5 (04 Jul 2020 08:09), Max: 37.2 (03 Jul 2020 15:26)  T(F): 97.7 (04 Jul 2020 08:09), Max: 98.9 (03 Jul 2020 15:26)  HR: 103 (04 Jul 2020 08:09) (103 - 120)  BP: 150/94 (04 Jul 2020 08:09) (124/91 - 150/94)  BP(mean): --  RR: 18 (04 Jul 2020 08:09) (18 - 20)  SpO2: 97% (04 Jul 2020 08:09) (88% - 98%)    07-03 @ 07:01  -  07-04 @ 07:00  --------------------------------------------------------  IN: 600 mL / OUT: 0 mL / NET: 600 mL        PHYSICAL EXAM:    General: overweight female,  well nourished; in no acute distress  HEENT: MMM, conjunctiva and sclera clear  Gastrointestinal:Abdomen: Soft non-tender non-distended; Normal bowel sounds; No hepatosplenomegaly, old g tube site healing without discharge and leakage  Extremities: no cyanosis, clubbing or edema.  Skin: Warm and dry. No obvious rash    LABS:      CBC Full  -  ( 03 Jul 2020 05:35 )  WBC Count : 10.83 K/uL  RBC Count : 4.16 M/uL  Hemoglobin : 12.6 g/dL  Hematocrit : 39.0 %  Platelet Count - Automated : 189 K/uL  Mean Cell Volume : 93.8 fl  Mean Cell Hemoglobin : 30.3 pg  Mean Cell Hemoglobin Concentration : 32.3 gm/dL  Auto Neutrophil # : x  Auto Lymphocyte # : x  Auto Monocyte # : x  Auto Eosinophil # : x  Auto Basophil # : x  Auto Neutrophil % : x  Auto Lymphocyte % : x  Auto Monocyte % : x  Auto Eosinophil % : x  Auto Basophil % : x    07-03    143  |  102  |  8.0  ----------------------------<  170<H>  3.7   |  27.0  |  0.38<L>    Ca    9.8      03 Jul 2020 07:17    TPro  5.5<L>  /  Alb  2.8<L>  /  TBili  0.4  /  DBili  x   /  AST  26  /  ALT  15  /  AlkPhos  126<H>  07-03

## 2020-07-04 NOTE — PROGRESS NOTE ADULT - SUBJECTIVE AND OBJECTIVE BOX
CC: PEG dislodgement and closed stoma (02 Jul 2020 08:38)    INTERVAL HPI/OVERNIGHT EVENTS:  patient without acute events  no episodes of vomiting  tolerating tube feeds    Vital Signs Last 24 Hrs  T(C): 36.5 (04 Jul 2020 08:09), Max: 37.2 (03 Jul 2020 15:26)  T(F): 97.7 (04 Jul 2020 08:09), Max: 98.9 (03 Jul 2020 15:26)  HR: 103 (04 Jul 2020 08:09) (103 - 120)  BP: 150/94 (04 Jul 2020 08:09) (124/91 - 150/94)  BP(mean): --  RR: 18 (04 Jul 2020 08:09) (18 - 20)  SpO2: 97% (04 Jul 2020 08:09) (88% - 98%)    PHYSICAL EXAM:  General: in no acute distress; uncomfortable from pain  Eyes: PERRLA, EOMI; conjunctiva and sclera clear  Neck: Supple; non tender; no masses  Respiratory: No wheezes, rales or rhonchi  Cardiovascular: Regular rate and rhythm. S1 and S2 Normal; No murmurs, gallops or rubs  Gastrointestinal: Soft non-tender non-distended; Normal bowel sounds  Extremities: Normal range of motion, No clubbing, cyanosis or edema  Vascular: Peripheral pulses palpable 2+ bilaterally  Neurological: Alert and unable to assess if oriented; paralysis of left side, left hemineglect, left facial droop    MEDICATIONS  (STANDING):  levETIRAcetam  IVPB 250 milliGRAM(s) IV Intermittent every 12 hours  polyethylene glycol 3350 Oral Powder - Peds 17 Gram(s) Oral daily  senna 2 Tablet(s) Oral at bedtime    MEDICATIONS  (PRN):  morphine  - Injectable 4 milliGRAM(s) IV Push every 3 hours PRN Moderate Pain (4 - 6)

## 2020-07-04 NOTE — CHART NOTE - NSCHARTNOTEFT_GEN_A_CORE
Upon Nutritional Assessment by the Registered Dietitian your patient was determined to meet criteria / has evidence of the following diagnosis/diagnoses:          [ ]  Mild Protein Calorie Malnutrition        [x ]  Moderate Protein Calorie Malnutrition        [ ] Severe Protein Calorie Malnutrition        [ ] Unspecified Protein Calorie Malnutrition        [ ] Underweight / BMI <19        [ ] Morbid Obesity / BMI > 40    Pt presents at high nutrition risk secondary to malnutrition (moderate, chronic) related to inability to meet increased nutrient needs associated with glioblastoma s/p 2 craniotomies and recurrent PEG dislodgement as evidenced by mild muscle loss of temples, clavicles, shoulders; mild fat loss of orbitals, +edema.     Findings as based on:  •  Comprehensive nutrition assessment and consultation  •  Calorie counts (nutrient intake analysis)  •  Food acceptance and intake status from observations by staff  •  Follow up  •  Patient education  •  Intervention secondary to interdisciplinary rounds  •   concerns      Treatment:    The following has been recommended:  1) Consider continuous feeds while NGT in place; Glucerna 1.5, goal rate of 65 ml/hr (x20 hrs) to provide 1300 ml, 1950 kcal, 108g protein, 987 ml free water, and >100% of RDIs for vitamins/minerals; additional free water per MD discretion.  2) Monitor tube feed tolerance.  3) Obtain measured weights to monitor trends.      PROVIDER Section:     By signing this assessment you are acknowledging and agree with the diagnosis/diagnoses assigned by the Registered Dietitian    Comments:

## 2020-07-04 NOTE — DIETITIAN INITIAL EVALUATION ADULT. - OTHER INFO
60 year old female h/o glioblastoma s/p 2 craniotomies, chronic pain, diabetes, hyperlipidemia who presents from home with PEG tube dislodgement. October of last year pt had stroke like symptoms on the left and was brought to the hospital in [place] and found to have glioblastoma - s/p craniotomies. While in [place] had again stroke like symptoms that son reports resulted in the paralysis on the left arm, leg and facial droop with hemineglect. Per H&P, pt has been on home hospice since the last 2 months, however son reports pt is a full code. Pt/family was told that for her glioblastoma she was not a candidate for chemo/radiation. She has had multilpe ED visits for PEG tube dislodgements. The son states that the first time she had pulled it out and thereafter it seems to be dislodging on its own. Stoma closed in ED. Pt non-verbal, unable to provide full nutrition hx. H/o DM- HgA1c 5.6%. No height/weight documented in EMR and unable obtain via bedscale. Documented weight in transfer sheet of 225 lbs. Pt with NGT in place, tube feeds not yet running. Aware of plan for PEG replacement next week. Palliative consulted for GOC. 60 year old female h/o glioblastoma s/p 2 craniotomies, chronic pain, diabetes, hyperlipidemia who presents from home with PEG tube dislodgement. October of last year pt had stroke like symptoms on the left and was brought to the hospital in [place] and found to have glioblastoma - s/p craniotomies. While in [place] had again stroke like symptoms that son reports resulted in the paralysis on the left arm, leg and facial droop with hemineglect. Per H&P, pt has been on home hospice since the last 2 months, however son reports pt is a full code. Pt/family was told that for her glioblastoma she was not a candidate for chemo/radiation. She has had multilpe ED visits for PEG tube dislodgements. The son states that the first time she had pulled it out and thereafter it seems to be dislodging on its own. Stoma closed in ED. Pt non-verbal, unable to provide full nutrition hx. H/o DM- HgA1c 5.6%. No height/weight documented in EMR and unable obtain via bedscale. Documented weight in transfer sheet of 225 lbs. Pt with NGT in place, receiving bolus feeds at this time. Aware of plan for PEG replacement next week. Palliative consulted for GOC.

## 2020-07-04 NOTE — DIETITIAN INITIAL EVALUATION ADULT. - ENTERAL
consider continuous feeds while NGT in place; Glucerna 1.5, goal rate of 65 ml/hr (x20 hrs) to provide 1300 ml, 1950 kcal, 108g protein, 987 ml free water, and >100% of RDIs for vitamins/minerals; additional free water per MD discretion.

## 2020-07-04 NOTE — PROGRESS NOTE ADULT - ASSESSMENT
60 y/oF with h/o glioblastoma s/p 2 craniotomies, chronic pain  on oxycodone, lorazepam, diabetes who presents from NH with PEG tube dislogdgement. In ED patient stoma closed. Called for admission. GI following.    #PEG tube dislodgement  - NGT in place  - continue tube feeds  - PEG likely Monday    #acute hypoxic resp failure - mostly resolved  - CXR from yesterday shows no active disease  - O2 is improving at this time  - can add D-dimer  - hold IV fluids    #acute metabolic encephalopathy - resolved  - likely from morphine  - CT head without significant change from prior exam  - continue to monitor for now  - EEG abnormal but no focal epileptiform activity  - will try to increase keppra dose back to her baseline of 500mg BID    #hypotension - likely distributive due to morphine yesterday - resolved  - now improved after fluids  - hold fluids as above    #abdominal pain - resolved  - lipase normal  - CT abdomen unremarkable  - will hold off on replacing PEG and if improved by weekend can look to replace possibly Monday  - laxatives to help patient have BM    #chronic pain  - continue morphine 4mg q4 PRN    #DM - A1c 5.6  - no FS or ISS for now  - not likely to need metformin on discharge  - she is diet controlled at this time given she is on glucerna tube feeds  - A1c 5.6    #HTN  - restart amlodipine if clinically indicated    #DVT ppx  - SCD for now

## 2020-07-05 NOTE — PROGRESS NOTE ADULT - SUBJECTIVE AND OBJECTIVE BOX
Pt seen and examined f/u for dislodged G tube    This AM she is even more alter and somewhat conversant and oriented. No complaints. Rolerating NG feeeds. No further nausea or vomiting.    REVIEW OF SYSTEMS:    CONSTITUTIONAL: No fever, weight loss, or fatigue  EYES: No eye pain, visual disturbances, or discharge  ENMT:  No difficulty hearing, tinnitus, vertigo; No sinus or throat pain  RESPIRATORY: No cough, wheezing, chills or hemoptysis; No shortness of breath  CARDIOVASCULAR: No chest pain, palpitations, dizziness, or leg swelling  GASTROINTESTINAL: No abdominal or epigastric pain. No nausea, vomiting, or hematemesis; No diarrhea or constipation. No melena or hematochezia.    MEDICATIONS:  MEDICATIONS  (STANDING):  ceFAZolin   IVPB 1000 milliGRAM(s) IV Intermittent once  levETIRAcetam  Solution 500 milliGRAM(s) Oral two times a day  polyethylene glycol 3350 Oral Powder - Peds 17 Gram(s) Oral daily  senna 2 Tablet(s) Oral at bedtime    MEDICATIONS  (PRN):  morphine  - Injectable 4 milliGRAM(s) IV Push every 3 hours PRN Moderate Pain (4 - 6)      Allergies    No Known Allergies    Intolerances        Vital Signs Last 24 Hrs  T(C): 36.9 (05 Jul 2020 08:04), Max: 36.9 (05 Jul 2020 08:04)  T(F): 98.4 (05 Jul 2020 08:04), Max: 98.4 (05 Jul 2020 08:04)  HR: 98 (05 Jul 2020 08:04) (98 - 108)  BP: 128/84 (05 Jul 2020 08:04) (128/84 - 142/82)  BP(mean): --  RR: 18 (05 Jul 2020 08:04) (18 - 18)  SpO2: 98% (05 Jul 2020 08:04) (97% - 99%)    07-04 @ 07:01  -  07-05 @ 07:00  --------------------------------------------------------  IN: 1000 mL / OUT: 300 mL / NET: 700 mL        PHYSICAL EXAM:    General: obese female  well nourished; in no acute distress  HEENT: MMM, conjunctiva and sclera clear  Gastrointestinal:Abdomen: Soft non-tender non-distended; Normal bowel sounds; No hepatosplenomegaly. prior G tube site clean without discharge  Extremities: no cyanosis, clubbing or edema.  Skin: Warm and dry. No obvious rash    LABS:

## 2020-07-05 NOTE — PROGRESS NOTE ADULT - ASSESSMENT
60 y/oF with h/o glioblastoma s/p 2 craniotomies, chronic pain  on oxycodone, lorazepam, diabetes who presents from NH with PEG tube dislogdgement. In ED patient stoma closed. Called for admission. GI following.    #PEG tube dislodgement  - NGT in place  - continue tube feeds  - PEG likely Monday    #acute hypoxic resp failure - mostly resolved  - CXR from yesterday shows no active disease  - O2 is improving at this time  - can add D-dimer  - hold IV fluids    #acute metabolic encephalopathy - resolved  - likely from morphine  - CT head without significant change from prior exam  - continue to monitor for now  - EEG abnormal but no focal epileptiform activity  - will try to increase keppra dose back to her baseline of 500mg BID    #hypotension - likely distributive due to morphine yesterday - resolved  - now improved after fluids  - hold fluids as above    #abdominal pain - resolved  - lipase normal  - CT abdomen unremarkable  - will hold off on replacing PEG and if improved by weekend can look to replace possibly Monday  - laxatives to help patient have BM    #chronic pain  - continue morphine 4mg q4 PRN    #DM - A1c 5.6  - no FS or ISS for now  - not likely to need metformin on discharge  - she is diet controlled at this time given she is on glucerna tube feeds  - A1c 5.6    #HTN  - restart amlodipine if clinically indicated    #DVT ppx  - SCD for now 60 y/oF with h/o glioblastoma s/p 2 craniotomies, chronic pain  on oxycodone, lorazepam, diabetes who presents from NH with PEG tube dislogdgement. In ED patient stoma closed. Called for admission. GI following.    #PEG tube dislodgement  - NGT in place  - continue tube feeds  - PEG tomorrow  - NPO after midnight    #acute hypoxic resp failure - resolved  - discontinue     #acute metabolic encephalopathy - resolved  - likely from morphine earlier in admission  - CT head without significant change from prior exam  - EEG abnormal but no focal epileptiform activity  - back on 500mg BID of keppra PO via NGT    #hypotension - likely distributive due to morphine yesterday - resolved  - now improved after fluids  - hold fluids as above    #abdominal pain - resolved  - lipase normal  - CT abdomen unremarkable  - will hold off on replacing PEG and if improved by weekend can look to replace possibly Monday  - laxatives to help patient have BM    #chronic pain  - continue morphine 4mg q4 PRN    #DM - A1c 5.6  - no FS or ISS for now  - not likely to need metformin on discharge  - she is diet controlled at this time given she is on glucerna tube feeds  - A1c 5.6    #HTN  - restart amlodipine if clinically indicated    #DVT ppx  - SCD

## 2020-07-05 NOTE — PROGRESS NOTE ADULT - SUBJECTIVE AND OBJECTIVE BOX
CC: PEG dislodgement and closed stoma (02 Jul 2020 08:38)    INTERVAL HPI/OVERNIGHT EVENTS:  patient without acute events  no episodes of vomiting  tolerating tube feeds    Vital Signs Last 24 Hrs  T(C): 36.5 (04 Jul 2020 08:09), Max: 37.2 (03 Jul 2020 15:26)  T(F): 97.7 (04 Jul 2020 08:09), Max: 98.9 (03 Jul 2020 15:26)  HR: 103 (04 Jul 2020 08:09) (103 - 120)  BP: 150/94 (04 Jul 2020 08:09) (124/91 - 150/94)  BP(mean): --  RR: 18 (04 Jul 2020 08:09) (18 - 20)  SpO2: 97% (04 Jul 2020 08:09) (88% - 98%)    PHYSICAL EXAM:  General: in no acute distress; uncomfortable from pain  Eyes: PERRLA, EOMI; conjunctiva and sclera clear  Neck: Supple; non tender; no masses  Respiratory: No wheezes, rales or rhonchi  Cardiovascular: Regular rate and rhythm. S1 and S2 Normal; No murmurs, gallops or rubs  Gastrointestinal: Soft non-tender non-distended; Normal bowel sounds  Extremities: Normal range of motion, No clubbing, cyanosis or edema  Vascular: Peripheral pulses palpable 2+ bilaterally  Neurological: Alert and unable to assess if oriented; paralysis of left side, left hemineglect, left facial droop    MEDICATIONS  (STANDING):  levETIRAcetam  IVPB 250 milliGRAM(s) IV Intermittent every 12 hours  polyethylene glycol 3350 Oral Powder - Peds 17 Gram(s) Oral daily  senna 2 Tablet(s) Oral at bedtime    MEDICATIONS  (PRN):  morphine  - Injectable 4 milliGRAM(s) IV Push every 3 hours PRN Moderate Pain (4 - 6) CC: PEG dislodgement and closed stoma (02 Jul 2020 08:38)    INTERVAL HPI/OVERNIGHT EVENTS:  no acute events overnight  patient without complaints  asking to drink pepsi  discussed bedside swallow with nurse  off O2, will discontinue     PHYSICAL EXAM:  General: in no acute distress; uncomfortable from pain  Eyes: PERRLA, EOMI; conjunctiva and sclera clear  Neck: Supple; non tender; no masses  Respiratory: No wheezes, rales or rhonchi  Cardiovascular: Regular rate and rhythm. S1 and S2 Normal; No murmurs, gallops or rubs  Gastrointestinal: Soft non-tender non-distended; Normal bowel sounds  Extremities: Normal range of motion, No clubbing, cyanosis or edema  Vascular: Peripheral pulses palpable 2+ bilaterally  Neurological: Alert and unable to assess if oriented; paralysis of left side, left hemineglect, left facial droop    MEDICATIONS  (STANDING):  levETIRAcetam  Solution 500 milliGRAM(s) Oral two times a day  polyethylene glycol 3350 Oral Powder - Peds 17 Gram(s) Oral daily  senna 2 Tablet(s) Oral at bedtime    MEDICATIONS  (PRN):  morphine  - Injectable 4 milliGRAM(s) IV Push every 3 hours PRN Moderate Pain (4 - 6)

## 2020-07-06 NOTE — PROGRESS NOTE ADULT - PROBLEM SELECTOR PLAN 1
Patient refusing Peg tube placement.  Palliative care team  made aware by hospitalist  Speech swallow dexteral Patient refusing replacement.  Appears to have full capacity to make end-of-life decisions, which are not unreasonable given underlying diagnosis.

## 2020-07-06 NOTE — PROGRESS NOTE ADULT - SUBJECTIVE AND OBJECTIVE BOX
Chief Complaint: This is a 60y old woman patient being seen in follow-up consultation for PEG dislodgement and closed stoma    HPI / 24H events:  Patient in no distress, awake and alert, naso gastric tube in place, refusing Peg tube re-placement, stating " I have enough of all this", as per patient son is aware of her wishes. Denies nausea, vomiting , dizziness , shortness of breath or chest pain    ROS: A 14-point review of systems was reviewed and was otherwise negative save what was reported in the HPI.    PAST MEDICAL/SURGICAL HISTORY:  Glioblastoma  H/O brain surgery    MEDICATIONS  (STANDING):  ceFAZolin   IVPB 1000 milliGRAM(s) IV Intermittent once  levETIRAcetam  Solution 500 milliGRAM(s) Oral two times a day  polyethylene glycol 3350 Oral Powder - Peds 17 Gram(s) Oral daily  senna 2 Tablet(s) Oral at bedtime    MEDICATIONS  (PRN):  morphine  - Injectable 4 milliGRAM(s) IV Push every 3 hours PRN Moderate Pain (4 - 6)    No Known Allergies    T(C): 37 (07-06-20 @ 07:26), Max: 37 (07-06-20 @ 07:26)  HR: 101 (07-06-20 @ 07:26) (97 - 101)  BP: 131/87 (07-06-20 @ 07:26) (131/87 - 150/94)  RR: 18 (07-06-20 @ 07:26) (18 - 18)  SpO2: 94% (07-06-20 @ 07:26) (94% - 96%)    I&O's Summary    05 Jul 2020 07:01  -  06 Jul 2020 07:00  --------------------------------------------------------  IN: 500 mL / OUT: 0 mL / NET: 500 mL      PHYSICAL EXAM:  Constitutional: Well-developed, well-nourished, in no apparent distress  Eyes: Sclerae anicteric, conjunctivae normal  ENMT: Mucus membranes moist, no oropharyngeal thrush noted  Neck: No thyroid nodules appreciated, no significant cervical or supraclavicular lymphadenopathy  Respiratory: Breathing nonlabored; clear to auscultation  Cardiovascular: Regular rate and rhythm  Gastrointestinal: Soft, nontender, nondistended, normoactive bowel sounds; no hepatosplenomegaly appreciated; no rebound tenderness or involuntary guarding  Rectal: Perianal exam within normal limits; normal sphincter tone; brown stool on glove  Extremities: No clubbing, cyanosis or edema  Neurological: Alert and oriented to person, place and time; no asterixis  Skin: No jaundice  Lymph Nodes: No significant lymphadenopathy  Musculoskeletal: No significant peripheral atrophy  Psychiatric: Affect and mood appropriate                   11.6   8.71  )-----------( 209      ( 07-06 @ 06:00 )             36.2       07-06    145  |  103  |  14.0  ----------------------------<  114<H>  3.8   |  31.0<H>  |  0.26<L>    Ca    9.1      06 Jul 2020 06:00            PT/INR - ( 06 Jul 2020 08:03 )   PT: 15.2 sec;   INR: 1.33 ratio             IMAGING: I personally reviewed the [XXXXXX], and I agree with the radiologist's interpretation as described below: Chief Complaint: This is a 60y old woman patient being seen in follow-up consultation for PEG dislodgement and closed stoma    HPI / 24H events:  Patient in no distress, awake and alert, naso gastric tube in place, refusing PEG tube re-placement, stating, "I've had enough of all this."  She reports her entire family is aware of her wishes.    ROS: A 14-point review of systems was reviewed and was otherwise negative save what was reported in the HPI.    PAST MEDICAL/SURGICAL HISTORY:  Glioblastoma  H/O brain surgery    MEDICATIONS  (STANDING):  ceFAZolin   IVPB 1000 milliGRAM(s) IV Intermittent once  levETIRAcetam  Solution 500 milliGRAM(s) Oral two times a day  polyethylene glycol 3350 Oral Powder - Peds 17 Gram(s) Oral daily  senna 2 Tablet(s) Oral at bedtime    MEDICATIONS  (PRN):  morphine  - Injectable 4 milliGRAM(s) IV Push every 3 hours PRN Moderate Pain (4 - 6)    No Known Allergies    T(C): 37 (07-06-20 @ 07:26), Max: 37 (07-06-20 @ 07:26)  HR: 101 (07-06-20 @ 07:26) (97 - 101)  BP: 131/87 (07-06-20 @ 07:26) (131/87 - 150/94)  RR: 18 (07-06-20 @ 07:26) (18 - 18)  SpO2: 94% (07-06-20 @ 07:26) (94% - 96%)    I&O's Summary    05 Jul 2020 07:01  -  06 Jul 2020 07:00  --------------------------------------------------------  IN: 500 mL / OUT: 0 mL / NET: 500 mL      PHYSICAL EXAM:  Constitutional: Well-developed, well-nourished, in no apparent distress  Eyes: Sclerae anicteric, conjunctivae normal  ENMT: Nasogastric tube in place  Neck: No thyroid nodules appreciated, no significant cervical or supraclavicular lymphadenopathy  Respiratory: Breathing nonlabored; clear to auscultation  Cardiovascular: Regular rate and rhythm  Gastrointestinal: Soft, nontender, nondistended, normoactive bowel sounds; no hepatosplenomegaly appreciated; no rebound tenderness or involuntary guarding  Extremities: No clubbing, cyanosis or edema  Neurological: No asterixis  Skin: No jaundice  Lymph Nodes: No significant lymphadenopathy  Psychiatric: Affect and mood appropriate                   11.6   8.71  )-----------( 209      ( 07-06 @ 06:00 )             36.2       07-06    145  |  103  |  14.0  ----------------------------<  114<H>  3.8   |  31.0<H>  |  0.26<L>    Ca    9.1      06 Jul 2020 06:00            PT/INR - ( 06 Jul 2020 08:03 )   PT: 15.2 sec;   INR: 1.33 ratio

## 2020-07-06 NOTE — PROGRESS NOTE ADULT - ASSESSMENT
60 y/oF with h/o glioblastoma s/p 2 craniotomies, chronic pain  on oxycodone, lorazepam, diabetes who presents from NH with PEG tube dislogdgement. In ED patient stoma closed. Called for admission. GI following.    #PEG tube dislodgement  - NGT in place  - PEG today then can remove NGT    #acute hypoxic resp failure - resolved  - discontinued     #acute metabolic encephalopathy - resolved  - likely from morphine earlier in admission  - CT head without significant change from prior exam  - EEG abnormal but no focal epileptiform activity  - back on 500mg BID of keppra PO via NGT    #hypotension - likely distributive due to morphine yesterday - resolved  - now improved after fluids  - hold fluids as above    #abdominal pain - resolved  - lipase normal  - CT abdomen unremarkable  - will hold off on replacing PEG and if improved by weekend can look to replace possibly Monday  - laxatives to help patient have BM    #chronic pain  - continue morphine 4mg q4 PRN  - can change to her home regimen  - will confirm with her son Elijah one more time as outpatient dose is very high    #DM - A1c 5.6  - no FS or ISS for now  - not likely to need metformin on discharge  - she is diet controlled at this time given she is on glucerna tube feeds  - A1c 5.6    #HTN  - restart amlodipine if clinically indicated    #DVT ppx  - SCD for now    DISPO: possibly discharge back to home with hospice when medically ready after PEG placement; palliative consulted overweekend to help address GOC possibly before discharge; she has had multiple ED visits related to PEG tube dislodgment 60 y/oF with h/o glioblastoma s/p 2 craniotomies, chronic pain  on oxycodone, lorazepam, diabetes who presents from NH with PEG tube dislogdgement. In ED patient stoma closed. Called for admission. GI following.    #PEG tube dislodgement  - NGT in place  - she is refusing PEG  - updated palliative team  - will discuss with son  - she state at home she eats and drinks thin liquids and soft foods  - will have speech therapy see her to see if she needs to have a PEG  - will keep son informed - attempted to call but went straight to [facility] x 2 - left message for a call back.    #acute hypoxic resp failure - resolved  - discontinued     #acute metabolic encephalopathy - resolved  - likely from morphine earlier in admission  - CT head without significant change from prior exam  - EEG abnormal but no focal epileptiform activity  - back on 500mg BID of keppra PO via NGT    #hypotension - likely distributive due to morphine yesterday - resolved  - now improved after fluids  - hold fluids as above    #abdominal pain - resolved  - lipase normal  - CT abdomen unremarkable  - will hold off on replacing PEG and if improved by weekend can look to replace possibly Monday  - laxatives to help patient have BM    #chronic pain  - continue morphine 4mg q4 PRN  - can change to her home regimen  - will confirm with her son Elijah one more time as outpatient dose is very high    #DM - A1c 5.6  - no FS or ISS for now  - not likely to need metformin on discharge  - she is diet controlled at this time given she is on glucerna tube feeds  - A1c 5.6    #HTN  - restart amlodipine if clinically indicated    #DVT ppx  - SCD for now    DISPO: originally planned for discharge back to home with hospice when medically ready after PEG placement; palliative consulted over weekend to help address GOC possibly before discharge; she has had multiple ED visits related to PEG tube dislodgment. She is now refusing replacement of PEG. Called to discuss with son this morning but went to [facility]. Canceled PEG placement. Palliative care made aware. For now patient wants to remain full code. Will ask for speech and swallow to see.

## 2020-07-06 NOTE — GOALS OF CARE CONVERSATION - ADVANCED CARE PLANNING - CONVERSATION DETAILS
DARSHAN received consult form service and contacted attending Dr Artis for approval. Hospitalist reports that patient has glioblastoma and was on home Hospice with son Elijah as caregiver prior but revoked with admission. At present plan was for Peg replacement. Palliative to proceed with further goals of care.     DARSHAN received call back form Dr Artis who reports patient has refused to proceed with replacing Peg and is aware she will die without eating and reports that she told physician that sj=he is not afraid to die. Case discussed with DR Li Palliative care Director and DARSHAN contacted Hospice liaison Sonal to attempt to reenact hospice services for patient. Sonal reports she will follow up with hospitalist and patients orlando Nava to coordinate services again at home for patient end of life planning.
[facility] RN telephoned patient's son, Elijah, to discuss resuming home hospice services upon patient's d/c home from [facility]. Lengthy conversation held - patient's son Elijah is adamantly opposed to the idea of patient's PEG not being replaced and stated that his uncle would visit patient this afternoon "to talk some sense into her." Elijah shared that patient is his only parent, that he is an only child, and that he is "not going to take her home to watch her starve to death."  Extreme emotional support and active listening provided. Will continue to follow.    Sonal Bergman RN  [phone]

## 2020-07-06 NOTE — PROGRESS NOTE ADULT - SUBJECTIVE AND OBJECTIVE BOX
CC: PEG dislodgement and closed stoma (02 Jul 2020 08:38)    INTERVAL HPI/OVERNIGHT EVENTS:  patient without acute events  no complaints  for PEG this morning  was NPO after midnight last night    PHYSICAL EXAM:  General: in no acute distress; uncomfortable from pain  Eyes: PERRLA, EOMI; conjunctiva and sclera clear  Neck: Supple; non tender; no masses  Respiratory: No wheezes, rales or rhonchi  Cardiovascular: Regular rate and rhythm. S1 and S2 Normal; No murmurs, gallops or rubs  Gastrointestinal: Soft non-tender non-distended; Normal bowel sounds  Extremities: Normal range of motion, No clubbing, cyanosis or edema  Vascular: Peripheral pulses palpable 2+ bilaterally  Neurological: Alert and unable to assess if oriented; paralysis of left side, left hemineglect, left facial droop                        11.6   8.71  )-----------( 209      ( 06 Jul 2020 06:00 )             36.2     07-06    145  |  103  |  14.0  ----------------------------<  114<H>  3.8   |  31.0<H>  |  0.26<L>    Ca    9.1      06 Jul 2020 06:00    MEDICATIONS  (STANDING):  levETIRAcetam  Solution 500 milliGRAM(s) Oral two times a day  polyethylene glycol 3350 Oral Powder - Peds 17 Gram(s) Oral daily  senna 2 Tablet(s) Oral at bedtime    MEDICATIONS  (PRN):  morphine  - Injectable 4 milliGRAM(s) IV Push every 3 hours PRN Moderate Pain (4 - 6) CC: PEG dislodgement and closed stoma (02 Jul 2020 08:38)    INTERVAL HPI/OVERNIGHT EVENTS:  patient without acute events  no complaints  originally for PEG this morning  this morning she is refusing  she is AO to person, hospital, year and knows where and who she lives with  she is aware without eating she will eventually die - she tells me she is not afraid of death  at this time her code status remains full  will update her son сергей of her decision - she told me that she had informed him yesterday.    PHYSICAL EXAM:  General: in no acute distress; uncomfortable from pain  Eyes: PERRLA, EOMI; conjunctiva and sclera clear  Neck: Supple; non tender; no masses  Respiratory: No wheezes, rales or rhonchi  Cardiovascular: Regular rate and rhythm. S1 and S2 Normal; No murmurs, gallops or rubs  Gastrointestinal: Soft non-tender non-distended; Normal bowel sounds  Extremities: Normal range of motion, No clubbing, cyanosis or edema  Vascular: Peripheral pulses palpable 2+ bilaterally  Neurological: Alert and unable to assess if oriented; paralysis of left side, left hemineglect, left facial droop                        11.6   8.71  )-----------( 209      ( 06 Jul 2020 06:00 )             36.2     07-06    145  |  103  |  14.0  ----------------------------<  114<H>  3.8   |  31.0<H>  |  0.26<L>    Ca    9.1      06 Jul 2020 06:00    MEDICATIONS  (STANDING):  levETIRAcetam  Solution 500 milliGRAM(s) Oral two times a day  polyethylene glycol 3350 Oral Powder - Peds 17 Gram(s) Oral daily  senna 2 Tablet(s) Oral at bedtime    MEDICATIONS  (PRN):  morphine  - Injectable 4 milliGRAM(s) IV Push every 3 hours PRN Moderate Pain (4 - 6)

## 2020-07-06 NOTE — PROGRESS NOTE ADULT - PROBLEM SELECTOR PROBLEM 1
Dislodged gastrostomy tube

## 2020-07-07 NOTE — DISCHARGE NOTE FOR THE EXPIRED PATIENT - HOSPITAL COURSE
61 y/o F with h/o glioblastoma s/p 2 craniotomies, chronic pain  on oxycodone, lorazepam, diabetes who presented from home 7/1/20 with PEG tube dislodgement In ED patient stoma closed. Patient had been on home hospice last 2 months per son who is primary care giver, on arrival to ER pt and son requested full code. NG tube placed 7/2 for nutrition and med administration and patient was made NPO. She was treated for for acute metabolic encephalopathy early in the admission, felt to be due to medication. CT head was without significant change and EEG showed no epileptiform activity. Hypotension improved with fluid administration. Abdominal pain was evaluated with CT which was unremarkable. Pain resolved. 7/2/20 developed hypoxia, improved with O2, monitored for s/s pneumonia off antibiotics. Diabetes remained diet controlled on glucerna. She was initially scheduled for PEG replacement but patient refused, stating she had been eating and drinking at home without difficulty, had been asking to be d/c back to home, continuing to refuse PEG or speech therapy evaluation.   Today, son in to talk to palliative care about goals of care and hospice consult requested for assistance with disposition.   At approx 1450 noted by aide to be unresponsive. CPR started and patient placed on defibrillator. Patient initially noted to be in asystole/PEA. Resuscitation efforts continued with CPR, epinephrine, bicarbonate and IVF. Time of death called at 1510 by Dr Longoria. Son was at hospital and notified of expiration. 61 y/o F with h/o glioblastoma (Dx 10/2019) s/p 2 craniotomies, chronic pain  on oxycodone, lorazepam, diabetes, HTN, PEG for nutrition who presented from home 7/1/20 with PEG tube dislodgement (history of multiple episodes where PEG became dislodged prior to this episode. On presentation to ED PEG stoma closed. Patient had been on home hospice last 2 months per son who is primary care giver, on arrival to ER pt and son requested full code. NG tube placed 7/2 for nutrition and med administration and patient was made NPO. She was treated for for acute metabolic encephalopathy early in the admission, felt to be due to medication. CT head was without significant change and EEG showed no epileptiform activity. Hypotension improved with fluid administration. Abdominal pain was evaluated with CT which was unremarkable. Pain resolved. 7/2/20 developed hypoxia, improved with O2, monitored for s/s pneumonia off antibiotics. Diabetes remained diet controlled on glucerna. She was initially scheduled for PEG replacement but patient refused, stating she had been eating and drinking at home without difficulty, had been asking to be d/c back to home, and contined to refuse PEG or speech therapy evaluation.   Today, son in to talk to palliative care about goals of care and hospice consult requested for assistance with disposition.   At approx 1450 noted by aide to be unresponsive. CPR started and patient placed on defibrillator. Patient initially noted to be in asystole/PEA. Resuscitation efforts continued with CPR, epinephrine, bicarbonate and IVF. Time of death called at 1510 by Dr Longoria. Son was at hospital and notified of expiration.   James Nava initially requested autopsy. ME's office notified. Case ID [id number]. James Nava subsequently changed his mind an declined autopsy. ME's office notified, spoke with Vero Henriquez, reviewed case and they see no need to pursue autopsy. Copy of this note provided to [facility] office for review.

## 2020-07-07 NOTE — CHART NOTE - NSCHARTNOTEFT_GEN_A_CORE
Source: Patient [ ]  Family [ ]   other [x ]    Current Diet: Diet, NPO:   Tube Feeding Modality: Nasogastric  Glucerna 1.5 Riley  Bolus  Total # of Feeds: 4  Tube Feed Frequency: Every 6 hours   Tube Feed Start Time: 14:00  Bolus Feed Rate (mL per Hour): 250   Bolus Feed Duration (in Hours): 1   Total Volume of Bolus (mL):  250 (07-03-20 @ 14:24)  Diet, NPO after Midnight:      NPO Start Date: 06-Jul-2020,   NPO Start Time: 23:59 (07-06-20 @ 21:21)    Enteral /Parenteral Nutrition: Tube feeds as ordered provide 1000 ml, 1500 kcal, 83g protein, 759 ml free water, and 100% of RDIs for vitamins/minerals.     Current Weight:   No weight documented  225 lbs per transfer sheet  Noted with 1+ generalized, left arm, and left hand edema    Pertinent Medications: MEDICATIONS  (STANDING):  ceFAZolin   IVPB 1000 milliGRAM(s) IV Intermittent once  levETIRAcetam  Solution 500 milliGRAM(s) Oral two times a day  polyethylene glycol 3350 Oral Powder - Peds 17 Gram(s) Oral daily  senna 2 Tablet(s) Oral at bedtime    MEDICATIONS  (PRN):  morphine  - Injectable 4 milliGRAM(s) IV Push every 3 hours PRN Moderate Pain (4 - 6)    Pertinent Labs: CBC Full  -  ( 06 Jul 2020 06:00 )  WBC Count : 8.71 K/uL  RBC Count : 3.78 M/uL  Hemoglobin : 11.6 g/dL  Hematocrit : 36.2 %  Platelet Count - Automated : 209 K/uL  Mean Cell Volume : 95.8 fl  Mean Cell Hemoglobin : 30.7 pg  Mean Cell Hemoglobin Concentration : 32.0 gm/dL  Auto Neutrophil # : x  Auto Lymphocyte # : x  Auto Monocyte # : x  Auto Eosinophil # : x  Auto Basophil # : x  Auto Neutrophil % : x  Auto Lymphocyte % : x  Auto Monocyte % : x  Auto Eosinophil % : x  Auto Basophil % : x    Skin: Intact per documentation    Nutrition focused physical exam previously conducted - found signs of malnutrition [ ]absent [x ]present    Subcutaneous fat loss: [ x] Orbital fat pads region, [ ]Buccal fat region, [ ]Triceps region,  [ ]Ribs region    Muscle wasting: [ x]Temples region, [x ]Clavicle region, [x]Shoulder region, [ ]Scapula region, [ ]Interosseous region,  [ ]thigh region, [ ]Calf region    Estimated Needs:   [ x] no change since previous assessment  [ ] recalculated:     Current Nutrition Diagnosis: Pt remains at high nutrition risk secondary to malnutrition (moderate, chronic) related to inability to meet increased nutrient needs associated with glioblastoma s/p 2 craniotomies and recurrent PEG dislodgement as evidenced by mild muscle loss of temples, clavicles, shoulders; mild fat loss of orbitals, +edema. Per documentation, pt refusing PEG. GOC being discussed. Last BM 6/6 per documentation.     Recommendations:   1) Honor pt/pt family wishes regarding nutrition/hydration - if to continue to receive feeds via NGT, consider continuous feeds- Glucerna 1.5, goal rate of 65 ml/hr (x20 hrs) to provide 1300 ml, 1950 kcal, 108g protein, 987 ml free water, and >100% of RDIs for vitamins/minerals; additional free water per MD discretion.  2) Monitor tube feed tolerance.  3) Obtain measured weights to monitor trends.    Monitoring and Evaluation:   [ ] PO intake [ x] Tolerance to diet prescription [X] Weights  [X] Follow up per protocol [X] Labs

## 2020-07-07 NOTE — CONSULT NOTE ADULT - REASON FOR ADMISSION
PEG dislodgement and closed stoma

## 2020-07-07 NOTE — PROGRESS NOTE ADULT - SUBJECTIVE AND OBJECTIVE BOX
Interval/Overnight Events: Patient was still adamant on not proceeding with G tube placement. Reports she spoke to her family on the phone yesterday and they are going to come in today to discuss situation with her further. Reports no issues sleeping overnight and denies abdominal pain. Reports passing flatus and having normal bowel movements. Denies headache, fevers, chills, chest pain, SOB, nausea, vomiting, diarrhea.      CONSTITUTIONAL: No fever, chills, fatigue.  RESPIRATORY: No cough, SOB, phlegm  CARDIOVASCULAR: No chest pain, palpitations  GASTROINTESTINAL: No abdominal pain, No nausea or vomiting.  NEUROLOGICAL: No headaches.  MISCELLANEOUS: No joint swelling or pain.    PHYSICAL EXAM:    Vital Signs Last 24 Hrs  T(C): 36.9 (07 Jul 2020 07:52), Max: 37.1 (06 Jul 2020 15:20)  T(F): 98.4 (07 Jul 2020 07:52), Max: 98.8 (06 Jul 2020 15:20)  HR: 98 (07 Jul 2020 07:52) (98 - 110)  BP: 148/88 (07 Jul 2020 07:52) (148/88 - 158/91)  BP(mean): --  RR: 18 (07 Jul 2020 07:52) (18 - 19)  SpO2: 95% (07 Jul 2020 07:52) (90% - 95%)    GENERAL: Pt lying comfortably, NAD.  NECK: soft, Supple, No JVD,   CHEST/LUNG: Clear to auscultation bilaterally; No wheezing.  HEART: S1S2+, Regular rate and rhythm; No murmurs.  ABDOMEN: Soft, Nontender, Nondistended; Bowel sounds present.  SKIN: Warm and dry.  NEURO: AAOX3.  PSYCH: normal mood.    MEDICATIONS  (STANDING):  ceFAZolin   IVPB 1000 milliGRAM(s) IV Intermittent once  levETIRAcetam  Solution 500 milliGRAM(s) Oral two times a day  polyethylene glycol 3350 Oral Powder - Peds 17 Gram(s) Oral daily  senna 2 Tablet(s) Oral at bedtime    MEDICATIONS  (PRN):  morphine  - Injectable 4 milliGRAM(s) IV Push every 3 hours PRN Moderate Pain (4 - 6) Interval/Overnight Events: Patient was still adamant on not proceeding with G tube placement. Reports she spoke to her family on the phone yesterday and they are going to come in today to discuss situation with her further. Reports no issues sleeping overnight and denies abdominal pain. Reports passing flatus and having normal bowel movements. Denies headache, fevers, chills, chest pain, SOB, nausea, vomiting, diarrhea.      CONSTITUTIONAL: No fever, chills, fatigue.  RESPIRATORY: No cough, SOB, phlegm  CARDIOVASCULAR: No chest pain, palpitations  GASTROINTESTINAL: No abdominal pain, No nausea or vomiting.  NEUROLOGICAL: No headaches.  MISCELLANEOUS: No joint swelling or pain.    PHYSICAL EXAM:    Vital Signs Last 24 Hrs  T(C): 36.9 (07 Jul 2020 07:52), Max: 37.1 (06 Jul 2020 15:20)  T(F): 98.4 (07 Jul 2020 07:52), Max: 98.8 (06 Jul 2020 15:20)  HR: 98 (07 Jul 2020 07:52) (98 - 110)  BP: 148/88 (07 Jul 2020 07:52) (148/88 - 158/91)  BP(mean): --  RR: 18 (07 Jul 2020 07:52) (18 - 19)  SpO2: 95% (07 Jul 2020 07:52) (90% - 95%)    GENERAL: Pt lying comfortably, NAD.  NECK: soft, Supple, No JVD,   CHEST/LUNG: Clear to auscultation bilaterally; No wheezing.  HEART: S1S2+, Regular rate and rhythm; No murmurs.  ABDOMEN: Soft, Nontender, Nondistended; Bowel sounds present.  SKIN: Warm and dry.  NEURO: AAOX3.  PSYCH: normal mood.    MEDICATIONS  (STANDING):  ceFAZolin   IVPB 1000 milliGRAM(s) IV Intermittent once  levETIRAcetam  Solution 500 milliGRAM(s) Oral two times a day  polyethylene glycol 3350 Oral Powder - Peds 17 Gram(s) Oral daily  senna 2 Tablet(s) Oral at bedtime    MEDICATIONS  (PRN):  morphine  - Injectable 4 milliGRAM(s) IV Push every 3 hours PRN Moderate Pain (4 - 6)    LABS:                        11.6   8.71  )-----------( 209      ( 06 Jul 2020 06:00 )             36.2     07-06    145  |  103  |  14.0  ----------------------------<  114<H>  3.8   |  31.0<H>  |  0.26<L>    Ca    9.1      06 Jul 2020 06:00      PT/INR - ( 06 Jul 2020 08:03 )   PT: 15.2 sec;   INR: 1.33 ratio Interval/Overnight Events: Patient was still adamant on not proceeding with G tube placement. Reports she spoke to her family on the phone yesterday and they are going to come in today to discuss situation with her further. Patient wants to drink water but is refusing a speech and swallow evaluation this morning. Reports no issues sleeping overnight and denies abdominal pain. Reports passing flatus and having normal bowel movements. Denies headache, fevers, chills, chest pain, SOB, nausea, vomiting, diarrhea.      CONSTITUTIONAL: No fever, chills, fatigue.  RESPIRATORY: No cough, SOB, phlegm  CARDIOVASCULAR: No chest pain, palpitations  GASTROINTESTINAL: No abdominal pain, No nausea or vomiting.  NEUROLOGICAL: No headaches.  MISCELLANEOUS: No joint swelling or pain.    PHYSICAL EXAM:    Vital Signs Last 24 Hrs  T(C): 36.9 (07 Jul 2020 07:52), Max: 37.1 (06 Jul 2020 15:20)  T(F): 98.4 (07 Jul 2020 07:52), Max: 98.8 (06 Jul 2020 15:20)  HR: 98 (07 Jul 2020 07:52) (98 - 110)  BP: 148/88 (07 Jul 2020 07:52) (148/88 - 158/91)  BP(mean): --  RR: 18 (07 Jul 2020 07:52) (18 - 19)  SpO2: 95% (07 Jul 2020 07:52) (90% - 95%)    GENERAL: Pt lying comfortably, NAD.  NECK: soft, Supple, No JVD,   CHEST/LUNG: Clear to auscultation bilaterally; No wheezing.  HEART: S1S2+, Regular rate and rhythm; No murmurs.  ABDOMEN: Soft, Nontender, Nondistended; Bowel sounds present.  SKIN: Warm and dry.  NEURO: AAOX3.  PSYCH: normal mood.    MEDICATIONS  (STANDING):  ceFAZolin   IVPB 1000 milliGRAM(s) IV Intermittent once  levETIRAcetam  Solution 500 milliGRAM(s) Oral two times a day  polyethylene glycol 3350 Oral Powder - Peds 17 Gram(s) Oral daily  senna 2 Tablet(s) Oral at bedtime    MEDICATIONS  (PRN):  morphine  - Injectable 4 milliGRAM(s) IV Push every 3 hours PRN Moderate Pain (4 - 6)    LABS:                        11.6   8.71  )-----------( 209      ( 06 Jul 2020 06:00 )             36.2     07-06    145  |  103  |  14.0  ----------------------------<  114<H>  3.8   |  31.0<H>  |  0.26<L>    Ca    9.1      06 Jul 2020 06:00      PT/INR - ( 06 Jul 2020 08:03 )   PT: 15.2 sec;   INR: 1.33 ratio

## 2020-07-07 NOTE — CONSULT NOTE ADULT - SUBJECTIVE AND OBJECTIVE BOX
Palliative Medicine Initial Consultation Note    HPI:  History from chart- patient poor historian  59 y/o F with h/o glioblastoma s/p 2 craniotomies, chronic pain  on oxycodone, lorazepam, diabetes, hyperlipidemia who presents from home with PEG tube dislogdement. Patient in October of last year had stroke like symptoms on the left and was brought to the hospital in [place] and found to have gliobastoma - underwent craniotomies as above. While in [place] had again stroke like symptoms that son reports resulted in the paralysis on the left arm, leg and facial droop with hemineglect. She has been on home hospice since the last 2 months, however the son tells me that she is a full code. They were told that for her glioblastoma she was not a candidate for chemo/radiation. She has had multilpe ED visits for PEG tube dislodgements. The son states that the first time she had pulled it out and thereafter it seems to be dislodging on its own. She has been on home hospice for the past 2 months. Son denies any fever, chills, cough at home.    PERTINENT PMH REVIEWED:  [x ] YES [ ] NO         Glioblastoma.     PAST SURGICAL HISTORY:  H/O brain surgery.     FAMILY HISTORY:    No Pertinent Family History in first degree relatives of: no family hx of sudden cardiac death.      SOCIAL HISTORY:  EtOH [ ] Yes  [x ] No                                    Drugs [ ] Yes [ x] No                                   [ ] smoker [x ] nonsmoker                                    Admitted from: [x ] home [ ] SNF _________ [ ] [facility] ________    Surrogate/HCP/Guardian:  Elijah Cota  FAMILY HISTORY:  No pertinent family history in first degree relatives      Baseline ADLs (prior to admission): unable to obtain - poor historian  Independent [ ] moderately [ ] fully   Dependent   [ ] moderately [ ]fully    MEDICATIONS  (STANDING):  ceFAZolin   IVPB 1000 milliGRAM(s) IV Intermittent once  levETIRAcetam  Solution 500 milliGRAM(s) Oral two times a day  polyethylene glycol 3350 Oral Powder - Peds 17 Gram(s) Oral daily  senna 2 Tablet(s) Oral at bedtime    MEDICATIONS  (PRN):  morphine  - Injectable 4 milliGRAM(s) IV Push every 3 hours PRN Moderate Pain (4 - 6)      Allergies    No Known Allergies    Intolerances      REVIEW OF SYSTEMS       [ x] Unable to obtain due to poor mentation       Karnofsky Performance Score/Palliative Performance Status Version 2: 40  %    Vital Signs Last 24 Hrs  T(C): 36.9 (07 Jul 2020 07:52), Max: 37.1 (06 Jul 2020 15:20)  T(F): 98.4 (07 Jul 2020 07:52), Max: 98.8 (06 Jul 2020 15:20)  HR: 98 (07 Jul 2020 07:52) (98 - 110)  BP: 148/88 (07 Jul 2020 07:52) (148/88 - 158/91)  BP(mean): --  RR: 18 (07 Jul 2020 07:52) (18 - 19)  SpO2: 95% (07 Jul 2020 07:52) (90% - 95%)    PHYSICAL EXAM:    General: WD woman, awake alert NAD    HEENT: + cranial scar, + NG tube    Lungs: [ x] comfortable [ ] tachypnea/labored breathing  [ ] excessive secretions    CV: [ x] normal  [ ] tachycardia    GI: [ x] normal  [ ] distended  [ ] tender  [ ] no BS               [ ] PEG/NG/OG tube    : [x ] normal  [ ] incontinent  [ ] oliguria/anuria  [ ] rico    MSK: [ ] normal  [ x] weakness  [ ] edema             [ ] ambulatory  x[ ] bedbound/wheelchair bound    Skin: [ ] normal  [ ] pressure ulcers- Stage_____  x[ ] no rash    LABS:                        11.6   8.71  )-----------( 209      ( 06 Jul 2020 06:00 )             36.2     07-06    145  |  103  |  14.0  ----------------------------<  114<H>  3.8   |  31.0<H>  |  0.26<L>    Ca    9.1      06 Jul 2020 06:00      PT/INR - ( 06 Jul 2020 08:03 )   PT: 15.2 sec;   INR: 1.33 ratio             I&O's Summary    07 Jul 2020 07:01  -  07 Jul 2020 12:45  --------------------------------------------------------  IN: 0 mL / OUT: 1 mL / NET: -1 mL        RADIOLOGY & ADDITIONAL STUDIES:  < from: CT Head No Cont (07.02.20 @ 16:50) >     EXAM:  CT BRAIN                          PROCEDURE DATE:  07/02/2020          INTERPRETATION:    CLINICAL INDICATION: Hypoactive delirium, right frontal glioblastoma    5mm axial sections of the brain were obtained from base to vertex, without theintravenous administration of contrast material. Coronal and sagittal generated reconstructed    Comparison is made with the prior CT of 6/16/2020.    The prior examination the small amount of postoperative air subjacent to the right frontal craniotomy defect has slightly decreased. As a small amount of mixed density postoperative collection. Extensive vasogenic edema is identified without significant mass effect. No change since the prior exam. Moderate atrophy is identified.    There has been previous bilateral lens replacement surgery.        IMPRESSION: Slightly decreased postoperative air subjacent to the right frontal craniotomy flap compared with 6/16/2020. No change in small residual postoperative collection or significant vasogenic edema without significant mass effect.    < end of copied text >        ADVANCE DIRECTIVES:  [ ] YES [ x] NO   DNR [ ] YES [ x] NO  Completed on:                     MOLST  [ ] YES [ ]x NO   Completed on:  Living Will  [ ] YES [x ] NO   Completed on: Palliative Medicine Initial Consultation Note    HPI:  History from chart- patient poor historian  61 y/o F with h/o glioblastoma s/p 2 craniotomies, chronic pain  on oxycodone, lorazepam, diabetes, hyperlipidemia who presents from home with PEG tube dislogdement. Patient in October of last year had stroke like symptoms on the left and was brought to the hospital in [place] and found to have gliobastoma - underwent craniotomies as above. While in [place] had again stroke like symptoms that son reports resulted in the paralysis on the left arm, leg and facial droop with hemineglect. She has been on home hospice since the last 2 months, however the son tells me that she is a full code. They were told that for her glioblastoma she was not a candidate for chemo/radiation. She has had multilpe ED visits for PEG tube dislodgements. The son states that the first time she had pulled it out and thereafter it seems to be dislodging on its own. She has been on home hospice for the past 2 months. Son denies any fever, chills, cough at home.      PERTINENT PMH REVIEWED:  [x ] YES [ ] NO         Glioblastoma.     PAST SURGICAL HISTORY:  H/O brain surgery.     FAMILY HISTORY:    No Pertinent Family History in first degree relatives of: no family hx of sudden cardiac death.      SOCIAL HISTORY:  EtOH [ ] Yes  [x ] No                                    Drugs [ ] Yes [ x] No                                   [ ] smoker [x ] nonsmoker                                    Admitted from: [x ] home [ ] SNF _________ [ ] [facility] ________    Surrogate/HCP/Guardian:  Elijah Cota  FAMILY HISTORY:  No pertinent family history in first degree relatives      Baseline ADLs (prior to admission): unable to obtain - poor historian  Independent [ ] moderately [ ] fully   Dependent   [ ] moderately [ ]fully    MEDICATIONS  (STANDING):  ceFAZolin   IVPB 1000 milliGRAM(s) IV Intermittent once  levETIRAcetam  Solution 500 milliGRAM(s) Oral two times a day  polyethylene glycol 3350 Oral Powder - Peds 17 Gram(s) Oral daily  senna 2 Tablet(s) Oral at bedtime    MEDICATIONS  (PRN):  morphine  - Injectable 4 milliGRAM(s) IV Push every 3 hours PRN Moderate Pain (4 - 6)      Allergies    No Known Allergies    Intolerances      REVIEW OF SYSTEMS       [ x] Unable to obtain due to poor mentation       Karnofsky Performance Score/Palliative Performance Status Version 2: 40  %    Vital Signs Last 24 Hrs  T(C): 36.9 (07 Jul 2020 07:52), Max: 37.1 (06 Jul 2020 15:20)  T(F): 98.4 (07 Jul 2020 07:52), Max: 98.8 (06 Jul 2020 15:20)  HR: 98 (07 Jul 2020 07:52) (98 - 110)  BP: 148/88 (07 Jul 2020 07:52) (148/88 - 158/91)  BP(mean): --  RR: 18 (07 Jul 2020 07:52) (18 - 19)  SpO2: 95% (07 Jul 2020 07:52) (90% - 95%)    PHYSICAL EXAM:    General: WD woman, awake alert NAD    HEENT: + cranial scar, + NG tube    Lungs: [ x] comfortable [ ] tachypnea/labored breathing  [ ] excessive secretions    CV: [ x] normal  [ ] tachycardia    GI: [ x] normal  [ ] distended  [ ] tender  [ ] no BS               [ ] PEG/NG/OG tube    : [x ] normal  [ ] incontinent  [ ] oliguria/anuria  [ ] rico    MSK: [ ] normal  [ x] weakness  [ ] edema             [ ] ambulatory  x[ ] bedbound/wheelchair bound    Skin: [ ] normal  [ ] pressure ulcers- Stage_____  x[ ] no rash    LABS:                        11.6   8.71  )-----------( 209      ( 06 Jul 2020 06:00 )             36.2     07-06    145  |  103  |  14.0  ----------------------------<  114<H>  3.8   |  31.0<H>  |  0.26<L>    Ca    9.1      06 Jul 2020 06:00      PT/INR - ( 06 Jul 2020 08:03 )   PT: 15.2 sec;   INR: 1.33 ratio             I&O's Summary    07 Jul 2020 07:01  -  07 Jul 2020 12:45  --------------------------------------------------------  IN: 0 mL / OUT: 1 mL / NET: -1 mL        RADIOLOGY & ADDITIONAL STUDIES:  < from: CT Head No Cont (07.02.20 @ 16:50) >     EXAM:  CT BRAIN                          PROCEDURE DATE:  07/02/2020          INTERPRETATION:    CLINICAL INDICATION: Hypoactive delirium, right frontal glioblastoma    5mm axial sections of the brain were obtained from base to vertex, without theintravenous administration of contrast material. Coronal and sagittal generated reconstructed    Comparison is made with the prior CT of 6/16/2020.    The prior examination the small amount of postoperative air subjacent to the right frontal craniotomy defect has slightly decreased. As a small amount of mixed density postoperative collection. Extensive vasogenic edema is identified without significant mass effect. No change since the prior exam. Moderate atrophy is identified.    There has been previous bilateral lens replacement surgery.        IMPRESSION: Slightly decreased postoperative air subjacent to the right frontal craniotomy flap compared with 6/16/2020. No change in small residual postoperative collection or significant vasogenic edema without significant mass effect.    < end of copied text >        ADVANCE DIRECTIVES:  [ ] YES [ x] NO   DNR [ ] YES [ x] NO  Completed on:                     MOLST  [ ] YES [ ]x NO   Completed on:  Living Will  [ ] YES [x ] NO   Completed on:

## 2020-07-07 NOTE — PROGRESS NOTE ADULT - REASON FOR ADMISSION
PEG dislodgement and closed stoma

## 2020-07-07 NOTE — CONSULT NOTE ADULT - PROBLEM SELECTOR RECOMMENDATION 2
Patient with history of dislodged PEG tube  Chart reviewed-  patient refusing  PEG tube replaced  Spoke to patient- verbalizing that she does not want PEG tube, emotional saying   "I just wants to go home".

## 2020-07-07 NOTE — PROGRESS NOTE ADULT - ASSESSMENT
60 y/oF with h/o glioblastoma s/p 2 craniotomies, chronic pain  on oxycodone, lorazepam, diabetes who presents from NH with PEG tube dislogdgement. In ED patient stoma closed. Called for admission. GI following.    #PEG tube dislodgement  - NGT in place  - she is still refusing PEG  - updated palliative team  - she state at home she eats and drinks thin liquids and soft foods  - will have speech therapy see her to see if she needs to have a PEG  - son reached; to have family come visit today to discuss PEG    #acute hypoxic resp failure - resolved  - discontinued     #acute metabolic encephalopathy - resolved  - likely from morphine earlier in admission  - CT head without significant change from prior exam  - EEG abnormal but no focal epileptiform activity  - back on 500mg BID of keppra PO via NGT    #hypotension - likely distributive due to morphine- resolved  - now improved after fluids  - hold fluids as above    #abdominal pain - resolved  - lipase normal  - CT abdomen unremarkable  - will hold off on replacing PEG and if improved by weekend can look to replace possibly Monday  - laxatives to help patient have BM    #chronic pain  - continue morphine 4mg q4 PRN  - can change to her home regimen  - will confirm with her son Elijah one more time as outpatient dose is very high    #DM - A1c 5.6  - no FS or ISS for now  - not likely to need metformin on discharge  - she is diet controlled at this time given she is on glucerna tube feeds  - A1c 5.6    #HTN  - restart amlodipine if clinically indicated    #DVT ppx  - SCD for now    DISPO: originally planned for discharge back to home with hospice when medically ready after PEG placement; palliative consulted over weekend to help address GOC possibly before discharge; she has had multiple ED visits related to PEG tube dislodgment. She is now refusing replacement of PEG. Called to discuss with son this morning but went to [facility]. Canceled PEG placement. Palliative care made aware. For now patient wants to remain full code. Will ask for speech and swallow to see. 60 y/oF with h/o glioblastoma s/p 2 craniotomies, chronic pain  on oxycodone, lorazepam, diabetes who presents from NH with PEG tube dislogdgement. In ED patient stoma closed. Called for admission. GI following.    #PEG tube dislodgement  - NGT in place  - she is still refusing PEG  - updated palliative team  - she state at home she eats and drinks thin liquids and soft foods  - son reached; to have family come visit today to discuss PEG    #acute hypoxic resp failure - resolved  - discontinued     #acute metabolic encephalopathy - resolved  - likely from morphine earlier in admission  - CT head without significant change from prior exam  - EEG abnormal but no focal epileptiform activity  - back on 500mg BID of keppra PO via NGT    #hypotension - likely distributive due to morphine- resolved  - now improved after fluids  - hold fluids as above    #abdominal pain - resolved  - lipase normal  - CT abdomen unremarkable  - will hold off on replacing PEG and if improved by weekend can look to replace possibly Monday  - laxatives to help patient have BM    #chronic pain  - continue morphine 4mg q4 PRN  - can change to her home regimen  - will confirm with her son Elijah one more time as outpatient dose is very high    #DM - A1c 5.6  - no FS or ISS for now  - not likely to need metformin on discharge  - she is diet controlled at this time given she is on glucerna tube feeds  - A1c 5.6    #HTN  - restart amlodipine if clinically indicated    #DVT ppx  - SCD for now    DISPO: originally planned for discharge back to home with hospice when medically ready after PEG placement; palliative consulted over weekend to help address GOC possibly before discharge; she has had multiple ED visits related to PEG tube dislodgment. She is now refusing replacement of PEG. Called to discuss [facility] this [facility]. Palliative care made aware. For now patient wants to remain full code. Will ask for speech and swallow to see. 60 y/oF with h/o glioblastoma s/p 2 craniotomies, chronic pain  on oxycodone, lorazepam, diabetes who presents from NH with PEG tube dislogdgement. In ED patient stoma closed. Called for admission. GI following.    #PEG tube dislodgement  - NGT in place- continue tube feeds  - she is still refusing PEG, refusing to work with speech therapy for swallow eval  - she state at home she eats and drinks thin liquids and soft foods  - family to come visit today to discuss PEG/disposition - was on hospice at home    #acute metabolic encephalopathy  - likely from morphine earlier in admission  - CT head without significant change from prior exam  - EEG abnormal but no focal epileptiform activity  - back on 500mg BID of keppra PO via NGT    #hypotension - likely distributive due to morphine- resolved  - improved after fluids    #abdominal pain - resolved  - lipase normal  - CT abdomen unremarkable  - laxatives to help patient have BM    #acute hypoxic resp failure - resolved    #chronic pain  - continue morphine 4mg q4 PRN      #DM - A1c 5.6  - no FS or ISS for now  - not likely to need metformin on discharge  - she is diet controlled at this time given she is on glucerna tube feeds  - A1c 5.6    #HTN  - restart amlodipine if clinically indicated    #DVT ppx  - SCD for now    DISPO: originally planned for discharge back to home with hospice when medically ready after PEG placement; palliative consulted over weekend to help address GOC possibly before discharge; she has had multiple ED visits related to PEG tube dislodgment. She is now refusing replacement of PEG. Anticipate family coming in to day to discuss goals of care and plan. Hospice consult requested for assistance with disposition.     Plan reviewed w Dr Rhoades, patient, RN and case mgmt

## 2020-07-07 NOTE — CONSULT NOTE ADULT - PROBLEM SELECTOR RECOMMENDATION 5
Met earlier with patient. She states she does not want the PEG tube and became upset stating " I just want to go home".  She did not want to further discuss. Emotional support.     Met with son Jax ( also goes by Elijah).  He had a chance to spend time with his mother  and speak  with Dr. Rhoades. Plan was for him to be present for SLP eval as mother refusing evaluation.   He tells me that he and his mother live with his uncle ( patient's brother) and he is the main caregiver.  Though he knows mother would like to be in [facility] with her sister,  that would not be practical secondary to her health and insurance.  He states that she has had multiple issues with the PEG tube being dislodge and feels it is a issue with the product.  He was open to continuing hospice services once discharged from the hospital.  He wanted to know her medication regimen as he felt that she was more cognizant while here in the hospital. We reviewed her home pain regimen. He reports mother was on Oxycodone 30mg about every 4 hours for pain mainly to her head and back.  He appeared surprised  when I informed him that  she was only on Morphine 4mg IVP PRN generally given once daily, given the fact that she was on much pain meds at home.  He appeared pleased because he felt  she was more alert with this regimen. We discussed consideration of Roxanol or Fentanyl upon discharge.   I informed him of my earlier meeting with her and how mother stated that she wanted to go home. She is still on NG tube. He was agreeable to be present during the SLP eval as mother had refused it.    Shortly after our meeting, patient had a Code Blue with unsuccessful resuscitation.  Emotional support to son.

## 2020-07-07 NOTE — CONSULT NOTE ADULT - PROBLEM SELECTOR RECOMMENDATION 3
Patient refusing SLP eval  Dr Rhoades discussed  with son Jax ( Elijah) that we will ask SLP to have in present during SLP eval.  Plan to discuss comfort feeds if patient unable to be place on a diet.

## 2020-07-07 NOTE — CONSULT NOTE ADULT - PROBLEM SELECTOR RECOMMENDATION 4
Reviewed ISTOP [id number] And discussed with Hospice, Sonal Bergman RN home regimen.   Patient on Oxycodone 30--40mg q4hr PRN  Ativan 2-3mg q4hr  Current hospital regimen- Morphine 4mg IVP q 3hr PRN   Son states pain mainly in head, back and legs  Will continue current regimen of Morphine while in hospital and monitor use. Patient stating of pain to head.  Discussed consideration of Fentanyl patch or Roxanol.   Son reports patient appears more cognizant and with it,  while here in hospital compared to home

## 2020-07-07 NOTE — PROGRESS NOTE ADULT - ATTENDING COMMENTS
Glioblastoma status post 2X craniotomy.  Encephalopathy from CVA .Decline in functional status. Bed bound.   Dislodge peg tube. Feeding via NGT.   Functional quadreplegia.  Minimal verbal responses.   Keppra 500mg bid via NGT  Patient refusing replacement of Peg tube  Discussed with son who confirmed full CODE.  And will encourage patient to have a swallow eval and re-start feeding by mouth.  Son said he does not want to override her mother will to NO peg tube, but will rather have her restart oral feeding.

## 2020-07-07 NOTE — CONSULT NOTE ADULT - SUBJECTIVE AND OBJECTIVE BOX
61 y/o F with h/o glioblastoma s/p 2 craniotomies, chronic pain  on oxycodone, lorazepam, diabetes, hyperlipidemia who presents from home with PEG tube dislogdement. Patient in October of last year had stroke like symptoms on the left and was brought to the hospital in [place] and found to have gliobastoma - underwent craniotomies as above. While in [place] had again stroke like symptoms that son reports resulted in the paralysis on the left arm, leg and facial droop with hemineglect. She has been on home hospice since the last 2 months, however the son tells me that she is a full code. They were told that for her glioblastoma she was not a candidate for chemo/radiation. She has had multilpe ED visits for PEG tube dislodgements. The son states that the first time she had pulled it out and thereafter it seems to be dislodging on its own. She has been on home hospice for the past 2 months. Son denies any fever, chills, cough at home.    Called to pt's bedside for pt found with CPR in progress via floor team.  Pt was being AMBU'd.  Pt last seen well approx 20 min prior.  CPR/ACLS performed as per guidelines.  See code sheet as well.  After approx 20 min Pt was intubated, see intubation note.  Pt had unsuccessful resuscitation.  Pt's son came to bedside and was updated, support offered.  Dr Gray and Dr Rhoades are bedside with son.

## 2020-07-07 NOTE — PROGRESS NOTE ADULT - PROVIDER SPECIALTY LIST ADULT
Gastroenterology
Hospitalist
Gastroenterology
Hospitalist
Gastroenterology
Gastroenterology

## 2024-09-06 NOTE — ED ADULT TRIAGE NOTE - BP NONINVASIVE DIASTOLIC (MM HG)
"Kathi Jose" Evin was seen and treated in our emergency department on 9/5/2024.  She may return to work on 09/09/2024.       If you have any questions or concerns, please don't hesitate to call.      LOU EASON    "
"Kathi Jose" Evin was seen and treated in our emergency department on 9/5/2024.  She may return to work on 09/09/2024.       If you have any questions or concerns, please don't hesitate to call.      LOU"
99